# Patient Record
Sex: MALE | Race: WHITE | Employment: OTHER | URBAN - METROPOLITAN AREA
[De-identification: names, ages, dates, MRNs, and addresses within clinical notes are randomized per-mention and may not be internally consistent; named-entity substitution may affect disease eponyms.]

---

## 2021-10-30 ENCOUNTER — HOSPITAL ENCOUNTER (INPATIENT)
Age: 21
LOS: 4 days | Discharge: HOME | DRG: 885 | End: 2021-11-03
Attending: STUDENT IN AN ORGANIZED HEALTH CARE EDUCATION/TRAINING PROGRAM | Admitting: PSYCHIATRY & NEUROLOGY
Payer: COMMERCIAL

## 2021-10-30 ENCOUNTER — APPOINTMENT (OUTPATIENT)
Dept: CT IMAGING | Age: 21
DRG: 885 | End: 2021-10-30
Payer: COMMERCIAL

## 2021-10-30 ENCOUNTER — APPOINTMENT (OUTPATIENT)
Dept: GENERAL RADIOLOGY | Age: 21
DRG: 885 | End: 2021-10-30
Payer: COMMERCIAL

## 2021-10-30 ENCOUNTER — APPOINTMENT (OUTPATIENT)
Dept: ULTRASOUND IMAGING | Age: 21
DRG: 885 | End: 2021-10-30
Payer: COMMERCIAL

## 2021-10-30 DIAGNOSIS — E87.6 HYPOKALEMIA: ICD-10-CM

## 2021-10-30 DIAGNOSIS — R45.1 AGITATION: ICD-10-CM

## 2021-10-30 DIAGNOSIS — F22 PARANOID (HCC): Primary | ICD-10-CM

## 2021-10-30 PROBLEM — F23 ACUTE PSYCHOSIS (HCC): Status: ACTIVE | Noted: 2021-10-30

## 2021-10-30 LAB
ACETAMINOPHEN LEVEL: <5 MCG/ML (ref 10–30)
ALBUMIN SERPL-MCNC: 5 G/DL (ref 3.5–5.2)
ALP BLD-CCNC: 59 U/L (ref 40–129)
ALT SERPL-CCNC: 17 U/L (ref 0–40)
AMPHETAMINE SCREEN, URINE: NOT DETECTED
ANION GAP SERPL CALCULATED.3IONS-SCNC: 15 MMOL/L (ref 7–16)
ANION GAP SERPL CALCULATED.3IONS-SCNC: 21 MMOL/L (ref 7–16)
AST SERPL-CCNC: 26 U/L (ref 0–39)
BACTERIA: ABNORMAL /HPF
BARBITURATE SCREEN URINE: NOT DETECTED
BASOPHILS ABSOLUTE: 0.04 E9/L (ref 0–0.2)
BASOPHILS RELATIVE PERCENT: 0.5 % (ref 0–2)
BENZODIAZEPINE SCREEN, URINE: POSITIVE
BETA-HYDROXYBUTYRATE: 0.7 MMOL/L (ref 0.02–0.27)
BILIRUB SERPL-MCNC: 1.7 MG/DL (ref 0–1.2)
BILIRUBIN URINE: ABNORMAL
BLOOD, URINE: NEGATIVE
BUN BLDV-MCNC: 13 MG/DL (ref 6–20)
BUN BLDV-MCNC: 13 MG/DL (ref 6–20)
CALCIUM SERPL-MCNC: 9.2 MG/DL (ref 8.6–10.2)
CALCIUM SERPL-MCNC: 9.4 MG/DL (ref 8.6–10.2)
CANNABINOID SCREEN URINE: POSITIVE
CHLORIDE BLD-SCNC: 105 MMOL/L (ref 98–107)
CHLORIDE BLD-SCNC: 96 MMOL/L (ref 98–107)
CHP ED QC CHECK: NORMAL
CLARITY: CLEAR
CO2: 16 MMOL/L (ref 22–29)
CO2: 20 MMOL/L (ref 22–29)
COCAINE METABOLITE SCREEN URINE: NOT DETECTED
COLOR: YELLOW
CREAT SERPL-MCNC: 1.1 MG/DL (ref 0.7–1.2)
CREAT SERPL-MCNC: 1.1 MG/DL (ref 0.7–1.2)
EOSINOPHILS ABSOLUTE: 0.01 E9/L (ref 0.05–0.5)
EOSINOPHILS RELATIVE PERCENT: 0.1 % (ref 0–6)
ETHANOL: <10 MG/DL (ref 0–0.08)
FENTANYL SCREEN, URINE: NOT DETECTED
GFR AFRICAN AMERICAN: >60
GFR AFRICAN AMERICAN: >60
GFR NON-AFRICAN AMERICAN: >60 ML/MIN/1.73
GFR NON-AFRICAN AMERICAN: >60 ML/MIN/1.73
GLUCOSE BLD-MCNC: 167 MG/DL
GLUCOSE BLD-MCNC: 167 MG/DL (ref 74–99)
GLUCOSE BLD-MCNC: 86 MG/DL (ref 74–99)
GLUCOSE URINE: NEGATIVE MG/DL
HCT VFR BLD CALC: 46.4 % (ref 37–54)
HEMOGLOBIN: 16 G/DL (ref 12.5–16.5)
IMMATURE GRANULOCYTES #: 0.03 E9/L
IMMATURE GRANULOCYTES %: 0.4 % (ref 0–5)
INFLUENZA A: NOT DETECTED
INFLUENZA B: NOT DETECTED
KETONES, URINE: 40 MG/DL
LACTIC ACID: 0.7 MMOL/L (ref 0.5–2.2)
LEUKOCYTE ESTERASE, URINE: NEGATIVE
LYMPHOCYTES ABSOLUTE: 1.06 E9/L (ref 1.5–4)
LYMPHOCYTES RELATIVE PERCENT: 14.4 % (ref 20–42)
Lab: ABNORMAL
MCH RBC QN AUTO: 30.2 PG (ref 26–35)
MCHC RBC AUTO-ENTMCNC: 34.5 % (ref 32–34.5)
MCV RBC AUTO: 87.7 FL (ref 80–99.9)
METHADONE SCREEN, URINE: NOT DETECTED
MONOCYTES ABSOLUTE: 0.29 E9/L (ref 0.1–0.95)
MONOCYTES RELATIVE PERCENT: 3.9 % (ref 2–12)
MUCUS: PRESENT /LPF
NEUTROPHILS ABSOLUTE: 5.92 E9/L (ref 1.8–7.3)
NEUTROPHILS RELATIVE PERCENT: 80.7 % (ref 43–80)
NITRITE, URINE: NEGATIVE
OPIATE SCREEN URINE: NOT DETECTED
OXYCODONE URINE: NOT DETECTED
PDW BLD-RTO: 11.8 FL (ref 11.5–15)
PH UA: 6 (ref 5–9)
PHENCYCLIDINE SCREEN URINE: NOT DETECTED
PLATELET # BLD: 260 E9/L (ref 130–450)
PMV BLD AUTO: 11 FL (ref 7–12)
POTASSIUM SERPL-SCNC: 3.3 MMOL/L (ref 3.5–5)
POTASSIUM SERPL-SCNC: 4.5 MMOL/L (ref 3.5–5)
PROTEIN UA: ABNORMAL MG/DL
RBC # BLD: 5.29 E12/L (ref 3.8–5.8)
RBC UA: ABNORMAL /HPF (ref 0–2)
SALICYLATE, SERUM: <0.3 MG/DL (ref 0–30)
SARS-COV-2 RNA, RT PCR: NOT DETECTED
SODIUM BLD-SCNC: 133 MMOL/L (ref 132–146)
SODIUM BLD-SCNC: 140 MMOL/L (ref 132–146)
SPECIFIC GRAVITY UA: 1.02 (ref 1–1.03)
T4 TOTAL: 8.5 MCG/DL (ref 4.5–11.7)
TOTAL PROTEIN: 7 G/DL (ref 6.4–8.3)
TRICYCLIC ANTIDEPRESSANTS SCREEN SERUM: NEGATIVE NG/ML
TSH SERPL DL<=0.05 MIU/L-ACNC: 1.48 UIU/ML (ref 0.27–4.2)
UROBILINOGEN, URINE: 1 E.U./DL
WBC # BLD: 7.4 E9/L (ref 4.5–11.5)
WBC UA: ABNORMAL /HPF (ref 0–5)

## 2021-10-30 PROCEDURE — 2580000003 HC RX 258: Performed by: STUDENT IN AN ORGANIZED HEALTH CARE EDUCATION/TRAINING PROGRAM

## 2021-10-30 PROCEDURE — 6370000000 HC RX 637 (ALT 250 FOR IP): Performed by: NURSE PRACTITIONER

## 2021-10-30 PROCEDURE — 96360 HYDRATION IV INFUSION INIT: CPT

## 2021-10-30 PROCEDURE — 83605 ASSAY OF LACTIC ACID: CPT

## 2021-10-30 PROCEDURE — 96361 HYDRATE IV INFUSION ADD-ON: CPT

## 2021-10-30 PROCEDURE — 84443 ASSAY THYROID STIM HORMONE: CPT

## 2021-10-30 PROCEDURE — 80143 DRUG ASSAY ACETAMINOPHEN: CPT

## 2021-10-30 PROCEDURE — 6370000000 HC RX 637 (ALT 250 FOR IP): Performed by: STUDENT IN AN ORGANIZED HEALTH CARE EDUCATION/TRAINING PROGRAM

## 2021-10-30 PROCEDURE — 80307 DRUG TEST PRSMV CHEM ANLYZR: CPT

## 2021-10-30 PROCEDURE — 76705 ECHO EXAM OF ABDOMEN: CPT

## 2021-10-30 PROCEDURE — 93005 ELECTROCARDIOGRAM TRACING: CPT | Performed by: STUDENT IN AN ORGANIZED HEALTH CARE EDUCATION/TRAINING PROGRAM

## 2021-10-30 PROCEDURE — 71045 X-RAY EXAM CHEST 1 VIEW: CPT

## 2021-10-30 PROCEDURE — 81001 URINALYSIS AUTO W/SCOPE: CPT

## 2021-10-30 PROCEDURE — 99285 EMERGENCY DEPT VISIT HI MDM: CPT

## 2021-10-30 PROCEDURE — 96372 THER/PROPH/DIAG INJ SC/IM: CPT

## 2021-10-30 PROCEDURE — 70450 CT HEAD/BRAIN W/O DYE: CPT

## 2021-10-30 PROCEDURE — 84436 ASSAY OF TOTAL THYROXINE: CPT

## 2021-10-30 PROCEDURE — 80053 COMPREHEN METABOLIC PANEL: CPT

## 2021-10-30 PROCEDURE — 87636 SARSCOV2 & INF A&B AMP PRB: CPT

## 2021-10-30 PROCEDURE — 1240000000 HC EMOTIONAL WELLNESS R&B

## 2021-10-30 PROCEDURE — 80048 BASIC METABOLIC PNL TOTAL CA: CPT

## 2021-10-30 PROCEDURE — 85025 COMPLETE CBC W/AUTO DIFF WBC: CPT

## 2021-10-30 PROCEDURE — 6360000002 HC RX W HCPCS

## 2021-10-30 PROCEDURE — 82077 ASSAY SPEC XCP UR&BREATH IA: CPT

## 2021-10-30 PROCEDURE — 80179 DRUG ASSAY SALICYLATE: CPT

## 2021-10-30 PROCEDURE — 82010 KETONE BODYS QUAN: CPT

## 2021-10-30 RX ORDER — HYDROXYZINE PAMOATE 50 MG/1
50 CAPSULE ORAL 3 TIMES DAILY PRN
Status: DISCONTINUED | OUTPATIENT
Start: 2021-10-30 | End: 2021-11-03 | Stop reason: HOSPADM

## 2021-10-30 RX ORDER — HALOPERIDOL 5 MG
5 TABLET ORAL EVERY 6 HOURS PRN
Status: DISCONTINUED | OUTPATIENT
Start: 2021-10-30 | End: 2021-11-03 | Stop reason: HOSPADM

## 2021-10-30 RX ORDER — MIDAZOLAM HYDROCHLORIDE 2 MG/2ML
2 INJECTION, SOLUTION INTRAMUSCULAR; INTRAVENOUS ONCE
Status: COMPLETED | OUTPATIENT
Start: 2021-10-30 | End: 2021-10-30

## 2021-10-30 RX ORDER — TRAZODONE HYDROCHLORIDE 50 MG/1
50 TABLET ORAL NIGHTLY PRN
Status: DISCONTINUED | OUTPATIENT
Start: 2021-10-30 | End: 2021-11-03 | Stop reason: HOSPADM

## 2021-10-30 RX ORDER — OLANZAPINE 5 MG/1
10 TABLET, ORALLY DISINTEGRATING ORAL 2 TIMES DAILY
Status: DISCONTINUED | OUTPATIENT
Start: 2021-10-31 | End: 2021-10-31

## 2021-10-30 RX ORDER — POTASSIUM CHLORIDE 20 MEQ/1
40 TABLET, EXTENDED RELEASE ORAL ONCE
Status: COMPLETED | OUTPATIENT
Start: 2021-10-30 | End: 2021-10-30

## 2021-10-30 RX ORDER — ACETAMINOPHEN 325 MG/1
650 TABLET ORAL EVERY 6 HOURS PRN
Status: DISCONTINUED | OUTPATIENT
Start: 2021-10-30 | End: 2021-11-03 | Stop reason: HOSPADM

## 2021-10-30 RX ORDER — OLANZAPINE 5 MG/1
10 TABLET ORAL ONCE
Status: COMPLETED | OUTPATIENT
Start: 2021-10-30 | End: 2021-10-30

## 2021-10-30 RX ORDER — DIPHENHYDRAMINE HYDROCHLORIDE 50 MG/ML
INJECTION INTRAMUSCULAR; INTRAVENOUS
Status: COMPLETED
Start: 2021-10-30 | End: 2021-10-30

## 2021-10-30 RX ORDER — 0.9 % SODIUM CHLORIDE 0.9 %
1000 INTRAVENOUS SOLUTION INTRAVENOUS ONCE
Status: COMPLETED | OUTPATIENT
Start: 2021-10-30 | End: 2021-10-30

## 2021-10-30 RX ORDER — MAGNESIUM HYDROXIDE/ALUMINUM HYDROXICE/SIMETHICONE 120; 1200; 1200 MG/30ML; MG/30ML; MG/30ML
30 SUSPENSION ORAL PRN
Status: DISCONTINUED | OUTPATIENT
Start: 2021-10-30 | End: 2021-11-03 | Stop reason: HOSPADM

## 2021-10-30 RX ORDER — DROPERIDOL 2.5 MG/ML
5 INJECTION, SOLUTION INTRAMUSCULAR; INTRAVENOUS ONCE
Status: COMPLETED | OUTPATIENT
Start: 2021-10-30 | End: 2021-10-30

## 2021-10-30 RX ORDER — ZIPRASIDONE MESYLATE 20 MG/ML
20 INJECTION, POWDER, LYOPHILIZED, FOR SOLUTION INTRAMUSCULAR EVERY 12 HOURS PRN
Status: DISCONTINUED | OUTPATIENT
Start: 2021-10-30 | End: 2021-11-03 | Stop reason: HOSPADM

## 2021-10-30 RX ORDER — MIDAZOLAM HYDROCHLORIDE 1 MG/ML
INJECTION INTRAMUSCULAR; INTRAVENOUS
Status: COMPLETED
Start: 2021-10-30 | End: 2021-10-30

## 2021-10-30 RX ORDER — DIPHENHYDRAMINE HYDROCHLORIDE 50 MG/ML
50 INJECTION INTRAMUSCULAR; INTRAVENOUS ONCE
Status: COMPLETED | OUTPATIENT
Start: 2021-10-30 | End: 2021-10-30

## 2021-10-30 RX ORDER — DROPERIDOL 2.5 MG/ML
INJECTION, SOLUTION INTRAMUSCULAR; INTRAVENOUS
Status: COMPLETED
Start: 2021-10-30 | End: 2021-10-30

## 2021-10-30 RX ORDER — NICOTINE 21 MG/24HR
1 PATCH, TRANSDERMAL 24 HOURS TRANSDERMAL DAILY
Status: DISCONTINUED | OUTPATIENT
Start: 2021-10-31 | End: 2021-10-30

## 2021-10-30 RX ORDER — DIVALPROEX SODIUM 250 MG/1
1000 TABLET, DELAYED RELEASE ORAL ONCE
Status: COMPLETED | OUTPATIENT
Start: 2021-10-30 | End: 2021-10-30

## 2021-10-30 RX ORDER — DIVALPROEX SODIUM 500 MG/1
500 TABLET, DELAYED RELEASE ORAL EVERY 12 HOURS SCHEDULED
Status: DISCONTINUED | OUTPATIENT
Start: 2021-10-30 | End: 2021-11-03 | Stop reason: HOSPADM

## 2021-10-30 RX ORDER — DEXTROSE AND SODIUM CHLORIDE 5; .9 G/100ML; G/100ML
INJECTION, SOLUTION INTRAVENOUS CONTINUOUS
Status: DISCONTINUED | OUTPATIENT
Start: 2021-10-30 | End: 2021-11-03 | Stop reason: HOSPADM

## 2021-10-30 RX ORDER — HALOPERIDOL 5 MG/ML
5 INJECTION INTRAMUSCULAR EVERY 6 HOURS PRN
Status: DISCONTINUED | OUTPATIENT
Start: 2021-10-30 | End: 2021-11-03 | Stop reason: HOSPADM

## 2021-10-30 RX ADMIN — DIVALPROEX SODIUM 1000 MG: 250 TABLET, DELAYED RELEASE ORAL at 21:10

## 2021-10-30 RX ADMIN — DIPHENHYDRAMINE HYDROCHLORIDE 50 MG: 50 INJECTION INTRAMUSCULAR; INTRAVENOUS at 12:51

## 2021-10-30 RX ADMIN — MIDAZOLAM 2 MG: 1 INJECTION INTRAMUSCULAR; INTRAVENOUS at 12:51

## 2021-10-30 RX ADMIN — MIDAZOLAM HYDROCHLORIDE 2 MG: 2 INJECTION, SOLUTION INTRAMUSCULAR; INTRAVENOUS at 12:51

## 2021-10-30 RX ADMIN — POTASSIUM CHLORIDE 40 MEQ: 20 TABLET, EXTENDED RELEASE ORAL at 17:33

## 2021-10-30 RX ADMIN — SODIUM CHLORIDE 1000 ML: 9 INJECTION, SOLUTION INTRAVENOUS at 14:11

## 2021-10-30 RX ADMIN — DROPERIDOL 5 MG: 2.5 INJECTION, SOLUTION INTRAMUSCULAR; INTRAVENOUS at 12:51

## 2021-10-30 RX ADMIN — DIPHENHYDRAMINE HYDROCHLORIDE 50 MG: 50 INJECTION, SOLUTION INTRAMUSCULAR; INTRAVENOUS at 12:51

## 2021-10-30 RX ADMIN — OLANZAPINE 10 MG: 5 TABLET, FILM COATED ORAL at 19:39

## 2021-10-30 ASSESSMENT — LIFESTYLE VARIABLES: HISTORY_ALCOHOL_USE: NO

## 2021-10-30 ASSESSMENT — SLEEP AND FATIGUE QUESTIONNAIRES
RESTFUL SLEEP: NO
DIFFICULTY STAYING ASLEEP: YES
DO YOU USE A SLEEP AID: NO
DIFFICULTY ARISING: YES
DIFFICULTY FALLING ASLEEP: YES
AVERAGE NUMBER OF SLEEP HOURS: 3
DO YOU HAVE DIFFICULTY SLEEPING: YES

## 2021-10-30 NOTE — ED NOTES
If further testing comes back ok, patient has been accepted to the psych unit. Pt is to have Depakote 1000 mg and Zyprexa 10 mg prior to going upstairs. Further orders will be placed once testing is resulted.       Yolanda Olvera RN  10/30/21 4577 Víctor Miller RN  10/30/21 9095

## 2021-10-30 NOTE — ED NOTES
Emergency Department [facility] AN AFFILIATE OF [facility] Biopsychosocial Assessment Note    Chief Complaint: pt is a 23 yo male on a pink slip who presents to the ED as a walk-in, pt accompanied by sister who provides information as pt was medicated due to agitation. Pt presenting as extremely paranoid with behavioral agitation, was banging head on walls, trying to leave unit, not cooperative to needed procedures. Voicing multiple delusional themes, also stating that \". .. everyone in the world is trying to kill me. \"  Sister reports a family hx of bipolar disorder, reports behavior began to reflect increasing paranoia over past 2 weeks, has increased greatly over past 48 hours resulting in ED visit today. Reports pt has been having difficulties in relationship with girlfriend over past couple moths, feels this might be triggering rise in symptoms. MSE: Pt is hyperalert, oriented only to self, behavior agitated, at times banging head and fists on walls, affect fearful, needs constant re-direction, at times must be restrained from hurting self. Voicing many delusional themes regarding conspiracy theories and groups who are stalking and trying to kill him. Clinical Summary/History: Sister reports pt is not open with anyone in community for counseling or psychiatric services, no hx of in-patient admissions of prescribed medications, does however report a significant family history of bi-polar disorder, reports pt's older brother committed suicide 2 years ago. Gender  [x] Male [] Female [] Transgender  [] Other    Sexual Orientation    [x] Heterosexual [] Homosexual [] Bisexual [] Other    Suicidal Behavioral: CSSR-S Complete. [] Reports:    [] Past [] Present  None stated  [] Denies    Homicidal/ Violent Behavior  [] Reports:   [] Past [] Present None stated  [] Denies     Hallucinations/Delusions   [x] Reports:  Clearly experiencing delusional ideation of a paranoid nature.    [] Denies     Substance Use/Alcohol Use/Addiction: SBIRT Screen Complete. [] Reports: Reports on-going marijuana use  [] Denies     Trauma History  [] Reports:  [] Denies - none stated.     Collateral Information:       Level of Care/Disposition Plan: Awaiting medical clearance and will be referred for psychiatric in-patient  [] Home:   [] Outpatient Provider:   [] Crisis Unit:   [x] Inpatient Psychiatric Unit:  [] Other:        Sherren Croon, MSW, MAHESH  10/30/21 9128

## 2021-10-30 NOTE — ED NOTES
Pt sister brought a bad of clean clothing for admission. Backpack was locked up in locker #27.       Myriam Nguyen, JESSICA, [place]  10/30/21 1940

## 2021-10-30 NOTE — ED PROVIDER NOTES
---------------------------------------------  Past Medical History:  has no past medical history on file. Past Surgical History:  has no past surgical history on file. Social History:      Family History: family history is not on file. . Unless otherwise noted, family history is non contributory    The patients home medications have been reviewed. Allergies: Patient has no allergy information on record. I have reviewed the past medical history, past surgical history, social history, and family history    ---------------------------------------------------PHYSICAL EXAM--------------------------------------    General: The patient is agitated. He is restrained by multiple police officers and attempting to punch  Head: Normocephalic and atraumatic. Eyes: Sclera non-icteric and no conjunctival injection  ENT: Nasal and oral membranes appear dry  Neck: Trachea midline. No JVD  Respiratory: Lungs clear to auscultation bilaterally. Patient speaking in full sentences. Cardiovascular: Tachycardic. No pedal edema. Gastrointestinal:  Abdomen is soft and nondistended. Bowel sounds present. There is no tenderness. There is no guarding or rebound. Musculoskeletal: No deformity. No bony tenderness  Skin: Skin warm and dry. No rashes. Neurologic: No gross motor deficits. No aphasia. Psychiatric: [N the patient has tangential speech. He is paranoid. He is stating that people are trying to kill him. The patient is attempting to talk to his phone stating that he is dying. He keeps mentioning Biden and Trump and is panicking    -------------------------------------------------- RESULTS -------------------------------------------------  I have personally reviewed all laboratory and imaging results for this patient. Results are listed below.      LABS: (Lab results interpreted by me)  Results for orders placed or performed during the hospital encounter of 10/30/21   COVID-19 & Influenza Combo    Specimen: Nasopharyngeal Swab   Result Value Ref Range    SARS-CoV-2 RNA, RT PCR NOT DETECTED NOT DETECTED    INFLUENZA A NOT DETECTED NOT DETECTED    INFLUENZA B NOT DETECTED NOT DETECTED   Comprehensive Metabolic Panel   Result Value Ref Range    Sodium 133 132 - 146 mmol/L    Potassium 3.3 (L) 3.5 - 5.0 mmol/L    Chloride 96 (L) 98 - 107 mmol/L    CO2 16 (L) 22 - 29 mmol/L    Anion Gap 21 (H) 7 - 16 mmol/L    Glucose 167 (H) 74 - 99 mg/dL    BUN 13 6 - 20 mg/dL    CREATININE 1.1 0.7 - 1.2 mg/dL    GFR Non-African American >60 >=60 mL/min/1.73    GFR African American >60     Calcium 9.4 8.6 - 10.2 mg/dL    Total Protein 7.0 6.4 - 8.3 g/dL    Albumin 5.0 3.5 - 5.2 g/dL    Total Bilirubin 1.7 (H) 0.0 - 1.2 mg/dL    Alkaline Phosphatase 59 40 - 129 U/L    ALT 17 0 - 40 U/L    AST 26 0 - 39 U/L   CBC Auto Differential   Result Value Ref Range    WBC 7.4 4.5 - 11.5 E9/L    RBC 5.29 3.80 - 5.80 E12/L    Hemoglobin 16.0 12.5 - 16.5 g/dL    Hematocrit 46.4 37.0 - 54.0 %    MCV 87.7 80.0 - 99.9 fL    MCH 30.2 26.0 - 35.0 pg    MCHC 34.5 32.0 - 34.5 %    RDW 11.8 11.5 - 15.0 fL    Platelets 381 955 - 880 E9/L    MPV 11.0 7.0 - 12.0 fL    Neutrophils % 80.7 (H) 43.0 - 80.0 %    Immature Granulocytes % 0.4 0.0 - 5.0 %    Lymphocytes % 14.4 (L) 20.0 - 42.0 %    Monocytes % 3.9 2.0 - 12.0 %    Eosinophils % 0.1 0.0 - 6.0 %    Basophils % 0.5 0.0 - 2.0 %    Neutrophils Absolute 5.92 1.80 - 7.30 E9/L    Immature Granulocytes # 0.03 E9/L    Lymphocytes Absolute 1.06 (L) 1.50 - 4.00 E9/L    Monocytes Absolute 0.29 0.10 - 0.95 E9/L    Eosinophils Absolute 0.01 (L) 0.05 - 0.50 E9/L    Basophils Absolute 0.04 0.00 - 0.20 E9/L   Serum Drug Screen   Result Value Ref Range    Ethanol Lvl <10 mg/dL    Acetaminophen Level <5.0 (L) 10.0 - 54.3 mcg/mL    Salicylate, Serum <8.7 0.0 - 30.0 mg/dL    TCA Scrn NEGATIVE Cutoff:300 ng/mL   Urine Drug Screen   Result Value Ref Range    Amphetamine Screen, Urine NOT DETECTED Negative <1000 ng/mL    Barbiturate Screen, Ur NOT DETECTED Negative < 200 ng/mL    Benzodiazepine Screen, Urine POSITIVE (A) Negative < 200 ng/mL    Cannabinoid Scrn, Ur POSITIVE (A) Negative < 50ng/mL    Cocaine Metabolite Screen, Urine NOT DETECTED Negative < 300 ng/mL    Opiate Scrn, Ur NOT DETECTED Negative < 300ng/mL    PCP Screen, Urine NOT DETECTED Negative < 25 ng/mL    Methadone Screen, Urine NOT DETECTED Negative <300 ng/mL    Oxycodone Urine NOT DETECTED Negative <100 ng/mL    FENTANYL SCREEN, URINE NOT DETECTED Negative <1 ng/mL    Drug Screen Comment: see below    TSH without Reflex   Result Value Ref Range    TSH 1.480 0.270 - 4.200 uIU/mL   T4   Result Value Ref Range    T4, Total 8.5 4.5 - 11.7 mcg/dL   Urinalysis with Microscopic   Result Value Ref Range    Color, UA Yellow Straw/Yellow    Clarity, UA Clear Clear    Glucose, Ur Negative Negative mg/dL    Bilirubin Urine SMALL (A) Negative    Ketones, Urine 40 (A) Negative mg/dL    Specific Gravity, UA 1.025 1.005 - 1.030    Blood, Urine Negative Negative    pH, UA 6.0 5.0 - 9.0    Protein, UA TRACE Negative mg/dL    Urobilinogen, Urine 1.0 <2.0 E.U./dL    Nitrite, Urine Negative Negative    Leukocyte Esterase, Urine Negative Negative    Mucus, UA Present (A) None Seen /LPF    WBC, UA 1-3 0 - 5 /HPF    RBC, UA NONE 0 - 2 /HPF    Bacteria, UA FEW (A) None Seen /HPF   Lactic Acid, Plasma   Result Value Ref Range    Lactic Acid 0.7 0.5 - 2.2 mmol/L   Basic metabolic panel   Result Value Ref Range    Sodium 140 132 - 146 mmol/L    Potassium 4.5 3.5 - 5.0 mmol/L    Chloride 105 98 - 107 mmol/L    CO2 20 (L) 22 - 29 mmol/L    Anion Gap 15 7 - 16 mmol/L    Glucose 86 74 - 99 mg/dL    BUN 13 6 - 20 mg/dL    CREATININE 1.1 0.7 - 1.2 mg/dL    GFR Non-African American >60 >=60 mL/min/1.73    GFR African American >60     Calcium 9.2 8.6 - 10.2 mg/dL   Beta-Hydroxybutyrate   Result Value Ref Range    Beta-Hydroxybutyrate 0.70 (H) 0.02 - 0.27 mmol/L   POCT Glucose   Result Value Ref Range Glucose 167 mg/dL    QC OK? ok    ,     RADIOLOGY:  Interpreted by Radiologist unless otherwise specified  [address]   Final Result   Normal appearance of the gallbladder and extrahepatic common bile duct. No   cholelithiasis or evidence of cholecystitis. CT HEAD WO CONTRAST   Final Result   No acute intracranial abnormality. There is sinus disease with retention cyst in the sphenoid and left maxillary   sinus         XR CHEST PORTABLE   Final Result   Normal             ------------------------- NURSING NOTES AND VITALS REVIEWED ---------------------------   The nursing notes within the ED encounter and vital signs as below have been reviewed by myself  /88   Pulse 89   Temp 98 °F (36.7 °C)   Resp 20   SpO2 98%     Oxygen Saturation Interpretation: Normal    The patients available past medical records and past encounters were reviewed. ------------------------------ ED COURSE/MEDICAL DECISION MAKING----------------------  Medications   dextrose 5 % and 0.9 % sodium chloride infusion (has no administration in time range)   divalproex (DEPAKOTE) DR tablet 1,000 mg (has no administration in time range)   droperidol (INAPSINE) injection 5 mg (5 mg IntraMUSCular Given 10/30/21 1251)   midazolam PF (VERSED) injection 2 mg (2 mg IntraMUSCular Given 10/30/21 1251)   diphenhydrAMINE (BENADRYL) injection 50 mg (50 mg IntraMUSCular Given 10/30/21 1251)   0.9 % sodium chloride bolus (0 mLs IntraVENous Stopped 10/30/21 1620)   potassium chloride (KLOR-CON M) extended release tablet 40 mEq (40 mEq Oral Given 10/30/21 1733)   OLANZapine (ZYPREXA) tablet 10 mg (10 mg Oral Given 10/30/21 1939)       Medical Decision Making: This is a 24 y.o. male presenting to the ED for paranoia. On initial presentation, the patient's vital signs are interpreted as normotensive, tachycardic, afebrile and saturating well on room air.  Based on history and physical exam, we have a broad differential.  We are concerned for substance use, new onset psychiatric disorder, or intracranial pathology. The patient is moving all extremities. He is a danger to self and others. He is attempting to physically hit police officers. He required sedation with droperidol, Benadryl, and Versed. As the patient continues to be agitated he will be restrained for the safety of himself and others. We cannot exclude metabolic causes of his change in mentation. At this time will obtain CT of the head as well as chest x-ray and laboratory studies. The patient will be hydrated with a liter bolus. Will obtain an EKG and social work will be consulted. A 12-lead EKG was performed and interpreted by myself. The rate is 112 with sinus tachycardia and right axis deviation. Patient has evidence of atrial enlargement. The NV interval is 130, QRS interval is 86, and QTC interval is 425. No acute ST elevation or depressions. There is sinus tachycardia with atrial large minute right axis deviation with nonspecific abnormality. The patient is calm down will be removed from restraints. Laboratory studies shows hypokalemia of 3.3. He does have acidosis with bicarbonate of 16. Anion gap 21. We will add lactic acid and beta hydroxybutyrate. I do feel this is likely starvation ketosis. Patient will be started on D5 0.9. Bilirubin elevated at 1.7. TSH within normal limits. Covid and influenza negative. Tylenol and salicylate negative. Tricyclic and alcohol negative. No leukocytosis or anemia. Urinalysis shows no evidence of infection. Urine drug screen positive for cannabinoids and benzodiazepines. Chest x-ray shows no acute process. CT of the head shows no acute acute abnormality. Sinus disease with retention cyst.  This is interpreted by radiology. Because the patient has elevated bilirubin I did reevaluate him. He denies any current abdominal pain. He does state he had abdominal pain a few days ago.   Abdominal exam is soft. No right upper quadrant tenderness. Will obtain an ultrasound to assess for further gallbladder evaluation. Repeat electrolytes will be performed after hydration. Repeat electrolytes show improving acidosis with bicarbonate of 20. Anion gap closed. Potassium within normal limits. Beta hydroxybutyrate mildly elevated. Ultrasound shows normal gallbladder and extrahepatic bile duct. No cholelithiasis or cholecystitis. This is interpreted by radiology. On reevaluation the patient's vitals have significantly improved. He is no longer tachycardic. He is acting appropriately. He does admit that he has been hallucinating. He is paranoid. He will require psychiatry evaluation and social work consult. The patient is medically cleared. This patient's ED course included: a personal history and physicial examination and re-evaluation prior to disposition    This patient has improved during their ED course. Consultations:  Social work    The cardiac monitor revealed sinus tachycardia with a heart rate in the 120s as interpreted by me. The cardiac monitor was ordered secondary to the patient's agitation and to monitor the patient for dysrhythmia. CPT [id number]    Oxygen Saturation Interpretation: 98 % on room air. Normal    Counseling:   I have spoken with the patient and discussed todays results, in addition to providing specific details for the plan of care and counseling regarding the diagnosis and prognosis. Questions are answered at this time and they are agreeable with the plan.     --------------------------------- IMPRESSION AND DISPOSITION ---------------------------------    IMPRESSION  1. Paranoid ([address]    2. Agitation    3. Hypokalemia        DISPOSITION  Disposition: Pending psychiatry evaluation, medically cleared  Patient condition is fair    IDr. Hieu, am the primary provider of record    NOTE: This report was transcribed using voice recognition software.  Every effort was made to ensure accuracy; however, inadvertent computerized transcription errors may be present        Mary Ann Longo MD  10/30/21 2050

## 2021-10-30 NOTE — ED NOTES
Patient has been accepted for admission to [facility] by Roman Neumann NP under Dr. Yuniel Lowe pending new medical orders and results.       JESSICA Willson, [place]  10/30/21 3069

## 2021-10-31 PROBLEM — F43.10 PTSD (POST-TRAUMATIC STRESS DISORDER): Status: ACTIVE | Noted: 2021-10-31

## 2021-10-31 PROBLEM — F31.2 SEVERE MANIC BIPOLAR 1 DISORDER WITH PSYCHOTIC BEHAVIOR (HCC): Status: ACTIVE | Noted: 2021-10-31

## 2021-10-31 PROBLEM — F23 ACUTE PSYCHOSIS (HCC): Status: RESOLVED | Noted: 2021-10-30 | Resolved: 2021-10-31

## 2021-10-31 PROBLEM — F12.10 CANNABIS ABUSE: Status: ACTIVE | Noted: 2021-10-31

## 2021-10-31 PROCEDURE — 6370000000 HC RX 637 (ALT 250 FOR IP): Performed by: PSYCHIATRY & NEUROLOGY

## 2021-10-31 PROCEDURE — 6370000000 HC RX 637 (ALT 250 FOR IP): Performed by: NURSE PRACTITIONER

## 2021-10-31 PROCEDURE — 99222 1ST HOSP IP/OBS MODERATE 55: CPT | Performed by: NURSE PRACTITIONER

## 2021-10-31 PROCEDURE — 1240000000 HC EMOTIONAL WELLNESS R&B

## 2021-10-31 RX ORDER — PRAZOSIN HYDROCHLORIDE 1 MG/1
1 CAPSULE ORAL NIGHTLY
Status: DISCONTINUED | OUTPATIENT
Start: 2021-10-31 | End: 2021-11-03 | Stop reason: HOSPADM

## 2021-10-31 RX ORDER — OLANZAPINE 5 MG/1
10 TABLET, ORALLY DISINTEGRATING ORAL NIGHTLY
Status: DISCONTINUED | OUTPATIENT
Start: 2021-11-01 | End: 2021-11-03 | Stop reason: HOSPADM

## 2021-10-31 RX ADMIN — TRAZODONE HYDROCHLORIDE 50 MG: 50 TABLET ORAL at 22:09

## 2021-10-31 RX ADMIN — OLANZAPINE 10 MG: 5 TABLET, ORALLY DISINTEGRATING ORAL at 09:49

## 2021-10-31 RX ADMIN — DIVALPROEX SODIUM 500 MG: 250 TABLET, DELAYED RELEASE ORAL at 09:49

## 2021-10-31 RX ADMIN — DIVALPROEX SODIUM 500 MG: 250 TABLET, DELAYED RELEASE ORAL at 22:07

## 2021-10-31 RX ADMIN — HYDROXYZINE PAMOATE 50 MG: 50 CAPSULE ORAL at 22:07

## 2021-10-31 RX ADMIN — PHENYTOIN 1 MG: 125 SUSPENSION ORAL at 22:07

## 2021-10-31 RX ADMIN — NICOTINE POLACRILEX 2 MG: 2 GUM, CHEWING BUCCAL at 16:54

## 2021-10-31 ASSESSMENT — SLEEP AND FATIGUE QUESTIONNAIRES
SLEEP PATTERN: DIFFICULTY FALLING ASLEEP;DISTURBED/INTERRUPTED SLEEP;INSOMNIA;RESTLESSNESS;DIFFICULTY ARISING
DO YOU HAVE DIFFICULTY SLEEPING: YES
AVERAGE NUMBER OF SLEEP HOURS: 5
DIFFICULTY ARISING: YES
DIFFICULTY FALLING ASLEEP: YES
DO YOU USE A SLEEP AID: NO
DIFFICULTY STAYING ASLEEP: YES
RESTFUL SLEEP: NO

## 2021-10-31 ASSESSMENT — LIFESTYLE VARIABLES: HISTORY_ALCOHOL_USE: NO

## 2021-10-31 ASSESSMENT — PAIN - FUNCTIONAL ASSESSMENT
PAIN_FUNCTIONAL_ASSESSMENT: 0-10
PAIN_FUNCTIONAL_ASSESSMENT: 0-10

## 2021-10-31 NOTE — BH NOTE
[facility]  Initial Interdisciplinary Treatment Plan NOTE    Review Date & Time: 10-31-21  1100 am    Patient was not in treatment team    Admission Type:   Admission Type: Involuntary    Reason for admission:  Reason for Admission: I QUIT SMOKING MJ HEAVY ABOUT A WEEK AGO, FELT OVERWHELMED EVERYTHING HIT ME. I WOULD FEEL SUICIDAL AT TIMES BUT WOULD NEVER  DO IT      Estimated Length of Stay Update:  2-4 days  Estimated Discharge Date Update: 2-4 days    EDUCATION:   Learner Progress Toward Treatment Goals: Reviewed results and recommendations of this team and Reviewed group plan and strategies    Method: Small group    Outcome: Verbalized understanding    PATIENT GOALS: pt did not report a goal during first morning first assessment. PLAN/TREATMENT RECOMMENDATIONS UPDATE: Begin medication regimen and assess pt responses. GOALS UPDATE:   Time frame for Short-Term Goals: Daily re assessment.      Jenelle Camarena RN

## 2021-10-31 NOTE — PROGRESS NOTES
States his thoughts are improved today and does feel safer today. Out of his room and at times will walk zepeda or watch football.

## 2021-10-31 NOTE — PLAN OF CARE
Problem: Altered Mood, Psychotic Behavior:  Goal: Able to demonstrate trust by eating, participating in treatment and following staff's direction  Description: Able to demonstrate trust by eating, participating in treatment and following staff's direction  10/31/2021 1807 by Syd Saravia RN  Outcome: Met This Shift     Problem: Altered Mood, Psychotic Behavior:  Goal: Able to verbalize decrease in frequency and intensity of hallucinations  Description: Able to verbalize decrease in frequency and intensity of hallucinations  Outcome: Met This Shift     Problem: Altered Mood, Psychotic Behavior:  Goal: Able to verbalize reality based thinking  Description: Able to verbalize reality based thinking  Outcome: Ongoing     Problem: Altered Mood, Psychotic Behavior:  Goal: Absence of self-harm  Description: Absence of self-harm  Outcome: Met This Shift     Problem: Altered Mood, Psychotic Behavior:  Goal: Ability to achieve adequate nutritional intake will improve  Description: Ability to achieve adequate nutritional intake will improve  Outcome: Met This Shift     Problem: Altered Mood, Psychotic Behavior:  Goal: Ability to interact with others will improve  Description: Ability to interact with others will improve  Outcome: Ongoing     Problem: Altered Mood, Psychotic Behavior:  Goal: Compliance with prescribed medication regimen will improve  Description: Compliance with prescribed medication regimen will improve  Outcome: Ongoing     Problem: Altered Mood, Psychotic Behavior:  Goal: Patient specific goal  Description: Patient specific goal  Outcome: Not Met This Shift

## 2021-10-31 NOTE — H&P
Department of Psychiatry  History and Physical - Adult   I have personally seen and assessed evaluated the patient this morning and I agree with the below assessment evaluation and recommendations by the medical student. Mental status examination reveals a 20-year-old  male with average hygiene average grooming superficially forthcoming and cooperative for assessment. Psychomotor reveals no agitation retardation involuntary movement or abnormal posture. Speech is clear, he is able to answer questions with relevance there is a slight latency in response. Eye contact is good. Mood is \"I know I am not right. \"  Affect is paranoid, guarded congruent with stated mood. Thought process is concrete tangential.  Thought content is paranoid he denies auditory or visual hallucinations however endorses rapid intrusive thoughts. He denies suicidal or homicidal ideation intent or plan however he was pink slipped yesterday for paranoid and agitated behaviors. There are no neurovegetative signs of depression. memory seems to be intact for conversation. Insight judgment impulse control are poor. He is alert and oriented to person place time and can recount some of the events that led to his hospitalization. CHIEF COMPLAINT:  \"I was losing it a bit yesterday\"    Patient was seen after discussing with the treatment team and reviewing the chart    CIRCUMSTANCES OF ADMISSION: Pt on 7SE today 10/31/21 after sister brought him to the ED yesterday where he was pink slipped for paranoid and agitated behavior. UDS positive for benzodiazepines and cannabis. Pt was medically cleared and hemodynamically stable PTA.      HISTORY OF PRESENT ILLNESS:      The patient is a 24 y.o. high school educated male self employed as a , born in [address] [place] [address], raised in [address] [place] [address] in a 3 year relationship with girlfriend, significant past history of marijuana use, with family hx of bipolar disorder in brother who committed suicide by hanging 2 years ago, maternal depression here on 7SE today 10/31/21 after his sister brought him to the ED yesterday where he was pink slipped for paranoid and agitated behavior. Per ED, pt has been talking about the government, paranoid that people are after him, believes he is dying and additionally admitted to [address] [place] [address] and recent marijuana use. Pt was agitated, uncooperative and attempted to punch security at ED. Pt's UDS was positive for benzodiazepines and cannabis. Per pt's sister, pt has been having frequent delusions e.g. \"everyone in the world is trying to kill me\" over the last 2 weeks with little to no sleep during this time. Pt given Depakote 1000 mg and Zyprexa 10 mg PTA to 7SE. Pt was medically cleared and hemodynamically stable PTA. On evaluation today in 7SE, pt is AAOx3 in [facility], cooperative with depressed mood. Pt states that he had an argument with his girlfriend 2 weeks ago about \"taking a break\" which triggered his mental decline. He states that over the last few weeks he felt that he \"couldn't think while I was talking and didn't know what I was saying\" and admits to only sleeping 1-2 hours a night during this time as well. Pt states that he felt as if \"everyone was against me\" yesterday but states those feelings have since resolved after sleeping well last night for the first time in a long time. He states he feels \"relieved\" that he can finally \"think properly\" after sleeping last night. Pt stating that this is the first time something like this has ever happened to him and denying hx of psychiatric admission or psychiatric issues in the past. He, however, admits to having had episodes of grandiose thinking and guillermo in the past.    Pt goes on to say that his mental health decline really began 2 years ago when his brother committed suicide via hanging himself. Pt was close to his brother.  He states that he feels guilty about what happened because he had a missed call from his brother the day he committed suicide and he feels that he could have maybe prevented his brother's death if he had picked up the phone or returned the call. Pt admits to having frequent intrusive thoughts, flashbacks and nightmares regarding this. He states that he began abusing marijuana 3 days after his brother's death in order to cope and increase his appetite and admits to having smoked marijuana 3x daily until he allegedly quit a week ago. He noticed that the marijuana made his mental health sx worse in that he felt more disorganized and confused. Since he has quit pt reports to feeling more \"antsy\" with decreased appetite. Pt admits that he has not fully processed the death of his brother and is interested in getting counseling at this time. He admits to feeling guilty, decreased interest in things that used to bring him maryann, poor concentration, poor appetite, feeling empty inside, easily abandoned and that he is not who he is supposed to be. Pt denies ever making a suicide attempt in the past, hx of cutting, SI, HI or AVH at this time. Past psych hx: Pt has never seen a psychiatrist in the past. Pt has no hx of previous psych admissions. On chart review, pt has no formal diagnoses and is not prescribed any medication at this time. Family hx: Maternal depression, brother () bipolar. Brother committed suicide 2019 by hanging. Substance abuse hx: Pt admits to only marijuana use and denies any other drug or alcohol use. No documented hx other than positive marijuana and benzodiazepines on UDS 10/31/21. Legal hx: Denies hx of legal issues. Social hx: Pt born in [facility], raised in [facility] with 4 siblings. Pt attended and graduated [employer] but did not attend college. He states that he starting [employer] and later started his own [id number]. Pt's parents are still alive and supportive of pt. Pt states that his father has firearms locked in safe at home.  Pt lives with father. Past Medical History:    History reviewed. No pertinent past medical history. Medications Prior to Admission:   No medications prior to admission. Past Surgical History:    History reviewed. No pertinent surgical history. Allergies:   Patient has no known allergies. Family History  Family History   Problem Relation Age of Onset    Mental Illness Mother     Mental Illness Brother          EXAMINATION:    REVIEW OF SYSTEMS:    ROS:  [x] All negative/unchanged except if checked.  Explain positive(checked items) below:  [] Constitutional  [] Eyes  [] Ear/Nose/Mouth/Throat  [] Respiratory  [] CV  [] GI  []   [] Musculoskeletal  [] Skin/Breast  [] Neurological  [] Endocrine  [] Heme/Lymph  [] Allergic/Immunologic    Explanation:     Vitals:  /68   Pulse 71   Temp 98.2 °F (36.8 °C)   Resp 15   Ht 5' 11.5\" (1.816 m)   Wt 165 lb 3.2 oz (74.9 kg)   SpO2 98%   BMI 22.72 kg/m²      Physical Examination:   Head: x  Atraumatic: x normocephalic  Skin and Mucosa        Moist x  Dry   Pale  x Normal   Neck:  Thyroid  Palpable   x  Not palpable   venus distention   adenopathy   Chest: x Clear   Rhonchi     Wheezing   CV:  xS1   xS2    xNo murmer   Abdomen:  x  Soft    Tender    Viceromegaly   Extremities:  x No Edema     Edema     Cranial Nerves Examination:   CN II:   xPupils are reactive to light  Pupils are non reactive to light  CN III, IV, VI:  xNo eye deviation    No diplopia or ptosis   CN V:    xFacial Sensation is intact     Facial Sensation is not intact   CN IIIV:   x Hearing is normal to rubbing fingers   CN IX, X:     xNormal gag reflex and phonation   CN XI:   xShoulder shrug and neck rotation is normal  CNXII:    xTongue is midline no deviation or atrophy    Mental Status Examination:    Level of consciousness:  within normal limits and awake   Appearance:  hospital attire, seated in bed, fair grooming and fair hygiene  Behavior/Motor:  no abnormalities noted  Attitude toward examiner:  cooperative and good eye contact  Speech:  normal rate, normal volume and monotone   Mood: depressed  Affect:  mood congruent and blunted  Thought processes:  linear and coherent   Thought content:  Homocidal ideation denies  Suicidal Ideation:  denies suicidal ideation, without plan and without intent  Delusions:  no evidence of delusions  Cognition:  oriented to person, place, and time   Concentration intact  Memory intact  Insight fair   Judgement poor   Fund of Knowledge adequate    DIAGNOSIS:    Bipolar 1 disorder, recent episode manic with psychotic features  PTSD  Cannabis abuse    LABS: REVIEWED TODAY:  Recent Labs     10/30/21  1204   WBC 7.4   HGB 16.0        Recent Labs     10/30/21  1204 10/30/21  1356 10/30/21  1730     --  140   K 3.3*  --  4.5   CL 96*  --  105   CO2 16*  --  20*   BUN 13  --  13   CREATININE 1.1  --  1.1   GLUCOSE 167* 167 86     Recent Labs     10/30/21  1204   BILITOT 1.7*   ALKPHOS 59   AST 26   ALT 17     Lab Results   Component Value Date    LABAMPH NOT DETECTED 10/30/2021    BARBSCNU NOT DETECTED 10/30/2021    LABBENZ POSITIVE 10/30/2021    LABMETH NOT DETECTED 10/30/2021    OPIATESCREENURINE NOT DETECTED 10/30/2021    PHENCYCLIDINESCREENURINE NOT DETECTED 10/30/2021    ETOH <10 10/30/2021     Lab Results   Component Value Date    TSH 1.480 10/30/2021     No results found for: LITHIUM  No results found for: VALPROATE, CBMZ  No results found for: LITHIUM, VALPROATE      Radiology CT HEAD WO CONTRAST    Result Date: 10/30/2021  EXAMINATION: CT OF THE HEAD WITHOUT CONTRAST  10/30/2021 4:16 pm TECHNIQUE: CT of the head was performed without the administration of intravenous contrast. Dose modulation, iterative reconstruction, and/or weight based adjustment of the mA/kV was utilized to reduce the radiation dose to as low as reasonably achievable. COMPARISON: None.  HISTORY: ORDERING SYSTEM PROVIDED HISTORY: mental status change TECHNOLOGIST PROVIDED HISTORY: Has a \"code stroke\" or \"stroke alert\" been called? ->No Reason for exam:->mental status change Decision Support Exception - unselect if not a suspected or confirmed emergency medical condition->Emergency Medical Condition (MA) What reading provider will be dictating this exam?->CRC FINDINGS: BRAIN/VENTRICLES: There is no acute intracranial hemorrhage, mass effect or midline shift. No abnormal extra-axial fluid collection. The gray-white differentiation is maintained without evidence of an acute infarct. There is no evidence of hydrocephalus. ORBITS: The visualized portion of the orbits demonstrate no acute abnormality. SINUSES: The visualized paranasal sinuses and mastoid air cells demonstrate no acute abnormality. Except for retention cyst in the right sphenoid and left maxillary sinuses. SOFT TISSUES/SKULL:  No acute abnormality of the visualized skull or soft tissues. No acute intracranial abnormality. There is sinus disease with retention cyst in the sphenoid and left maxillary sinus     US GALLBLADDER RUQ    Result Date: 10/30/2021  EXAMINATION: RIGHT UPPER QUADRANT ULTRASOUND 10/30/2021 6:39 pm COMPARISON: None. HISTORY: ORDERING SYSTEM PROVIDED HISTORY: elevated bili TECHNOLOGIST PROVIDED HISTORY: Reason for exam:->elevated bili What reading provider will be dictating this exam?->CRC FINDINGS: LIVER:  Imaged liver is grossly unremarkable. BILIARY SYSTEM:  Gallbladder is unremarkable without evidence of pericholecystic fluid, wall thickening or stones. Negative sonographic Duvall's sign reported. Common bile duct is within normal limits measuring 3-4 mm. OTHER: No evidence of right upper quadrant ascites. Normal appearance of the gallbladder and extrahepatic common bile duct. No cholelithiasis or evidence of cholecystitis. XR CHEST PORTABLE    Result Date: 10/30/2021  EXAMINATION: ONE XRAY VIEW OF THE CHEST 10/30/2021 12:02 pm COMPARISON: None.  HISTORY: ORDERING SYSTEM PROVIDED HISTORY: mental status change TECHNOLOGIST PROVIDED HISTORY: Reason for exam:->mental status change What reading provider will be dictating this exam?->CRC FINDINGS: The heart and mediastinum are normal for portable technique. Lung aeration and architecture is normal. The pleural surfaces appear normal.     Normal         TREATMENT PLAN:  The patient's diagnosis, treatment plan, medication management were formulated after patient was seen directly by the attending physician and myself and all relevant documentation was reviewed. The patient was referred to outpatient/inpatient substance abuse rehabilitation programming. He was educated multiple times during the hospitalization that if he chooses to continue to use drugs or alcohol, he may potentially act out impulsively, resulting in serious harm to self or others, even though unintentional.  He was also educated that mental health treatment cannot be optimized with ongoing use of drugs. He demonstrated understanding has the capacity to understand that. Risk, benefit, side effects, possible outcomes of the medication and alternatives discussed with the patient and the patient demonstrated understanding. The patient was also educated that the outcome of treatment will depend on the medication compliance as directed by the prescribers along with regular follow-up, compliance with the labs and other work-up, as clinically indicated. Risk Management: Based on the diagnosis and assessment biopsychosocial treatment model was presented to the patient and was given the opportunity to ask any question. The patient was agreeable to the plan and all the patient's questions were answered to the patient's satisfaction. I discussed with the patient the risk, benefit, alternative and common side effects for the proposed medication treatment. The patient is consenting to this treatment.      Collateral Information:  Will obtain collateral information from the family or friends. Will obtain medical records as appropriate from out patient providers  Will consult the hospitalist for a physical exam to rule out any co-morbid physical condition. Home medication Reconciled       New Medications started during this admission :    Depakote 500 mg twice daily for mood stabilization  Zyprexa 10 mg nightly for psychosis  Prazosin 1 mg nightly for PTSD symptoms including nightmares    Prn Haldol 5mg and Vistaril 50mg q6hr for extreme agitation. Trazodone as ordered for insomnia  Vistaril as ordered for anxiety      Psychotherapy:   Encourage participation in milieu and group therapy  Individual therapy as needed    NOTE: This report was transcribed using voice recognition software. Every effort was made to ensure accuracy; however, inadvertent computerized transcription errors may be present. Behavioral Services  Medicare Certification Upon Admission    I certify that this patient's inpatient psychiatric hospital admission is medically necessary for:    [x] (1) Treatment which could reasonably be expected to improve this patient's condition,       [x] (2) Or for diagnostic study;     AND     [x](2) The inpatient psychiatric services are provided while the individual is under the care of a physician and are included in the individualized plan of care.     Estimated length of stay/service 5 to 7 days based on stability    Plan for post-hospital care follow with outpatient provider    Electronically signed by ANSLEY Lucio CNP  on 10/31/2021 at 10:50 AM

## 2021-10-31 NOTE — PROGRESS NOTES
No behaviors noted on night shift and no PRN's were given. No signs or symptoms of discomfort or distress noted. Pt is lying in bed with eye's closed. Will continue fifteen minute rounds.

## 2021-10-31 NOTE — CARE COORDINATION
Biopsychosocial Assessment Note    LPC met with patient to complete the biopsychosocial assessment and CSSR-S. Mental Status Exam: Pt is alert and oriented x4. Pt presents with a pleasant, euthymic mood and congruent affect. Pt's eye contact is fair and speech and motor activity are normal. Pt confirms previous delusions but denies AVH. He reports his memory is good. Pt appears to minimize symptoms and exhibits poor insight and judgement. Pt denies a history of or current SI/HI. Chief Complaint: paranoid behavior, delusions    Patient Report: Pt reports he is here due to having a \"break down. \" Pt states he was thinking \"all kinds of crazy stuff,\" but states he now knows these things were not true. Pt reports he is still anxious but is feeling much better than when he was admitted. 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 Pt reports up until a week ago, he was using marijuana 3 times daily. He reports that he lives with two friends in an apartment currently, and he denies any issues with his living situation. Pt denies history of abuse or trauma, however per reports he previously stated he lost a loved one and felt it was traumatic. Pt works full-time as a self-employed . He denies any history of self-harm, SI, or HI. He denies any current treatment and states he has never had treatment prior to this admission. He reports he desires to seek counseling following discharge.      Gender  [x] Male [] Female [] Transgender  [] Other    Sexual Orientation    [x] Heterosexual [] Homosexual [] Bisexual [] Other    Suicidal Ideation  [] Past [] Present [x] Denies     Homicidal Ideation  [] Past [] Present [x] Denies     Hallucinations/Delusions (Specify type)  [x] Reports [] Denies     Substance Use/Alcohol Use/Addiction  [x] Reports [] Denies - marijuana use 3x daily up until 1 week ago    Tobacco Use (within the last 6 months)  [x] Reports [] Denies     Trauma History  [] Reports [x] Denies     Collateral Contact ([facility] signed)  Name: Jerome Enriquez  Relationship: sister   Number: [phone]    Collateral Information: Pt's sister states she is only concerned about the pt feeling better and having the delusions addressed. She states this is not like him, so it just made her worried. She denies any further concerns and believes he is safe to return to his home upon discharge. She denies that the pt has access to any weapons at his home. Access to Weapons per Collateral Contact: [] Reports [x] Denies       Follow up provider preference: Pt reports he does not have a current provider, but desires to begin counseling upon discharge. Plan for discharge  Location (where do they plan on discharging to?): Pt plans to discharge to his home, an apartment shared with 2 roommates. Transportation (who will pick them up at discharge?) Pt believes his sister or girlfriend will pick him up at discharge. Medications (will they have money for copays at discharge?): Pt states he can afford any prescriptions.        Electronically signed by Armani Fernandez LPC on 10/31/2021 at 9:11 AM

## 2021-10-31 NOTE — PLAN OF CARE
Pt is stable and without distress. Pt denies suicidal or homicidal ideations. Pt denies hallucinations. Pt does not report a goal at this time. No other distress. Will follow and monitor.

## 2021-11-01 LAB
EKG ATRIAL RATE: 112 BPM
EKG P AXIS: 81 DEGREES
EKG P-R INTERVAL: 130 MS
EKG Q-T INTERVAL: 312 MS
EKG QRS DURATION: 86 MS
EKG QTC CALCULATION (BAZETT): 425 MS
EKG R AXIS: 108 DEGREES
EKG T AXIS: 50 DEGREES
EKG VENTRICULAR RATE: 112 BPM

## 2021-11-01 PROCEDURE — 6370000000 HC RX 637 (ALT 250 FOR IP): Performed by: NURSE PRACTITIONER

## 2021-11-01 PROCEDURE — 1240000000 HC EMOTIONAL WELLNESS R&B

## 2021-11-01 PROCEDURE — 6370000000 HC RX 637 (ALT 250 FOR IP): Performed by: PSYCHIATRY & NEUROLOGY

## 2021-11-01 PROCEDURE — 99232 SBSQ HOSP IP/OBS MODERATE 35: CPT | Performed by: NURSE PRACTITIONER

## 2021-11-01 RX ADMIN — PHENYTOIN 1 MG: 125 SUSPENSION ORAL at 20:53

## 2021-11-01 RX ADMIN — OLANZAPINE 10 MG: 5 TABLET, ORALLY DISINTEGRATING ORAL at 20:54

## 2021-11-01 RX ADMIN — NICOTINE POLACRILEX 2 MG: 2 GUM, CHEWING BUCCAL at 11:53

## 2021-11-01 RX ADMIN — DIVALPROEX SODIUM 500 MG: 250 TABLET, DELAYED RELEASE ORAL at 10:18

## 2021-11-01 RX ADMIN — NICOTINE POLACRILEX 2 MG: 2 GUM, CHEWING BUCCAL at 17:18

## 2021-11-01 RX ADMIN — DIVALPROEX SODIUM 500 MG: 250 TABLET, DELAYED RELEASE ORAL at 20:54

## 2021-11-01 RX ADMIN — HYDROXYZINE PAMOATE 50 MG: 50 CAPSULE ORAL at 20:55

## 2021-11-01 ASSESSMENT — PAIN SCALES - GENERAL
PAINLEVEL_OUTOF10: 0
PAINLEVEL_OUTOF10: 0

## 2021-11-01 NOTE — PLAN OF CARE
Problem: Altered Mood, Psychotic Behavior:  Goal: Able to verbalize reality based thinking  Description: Able to verbalize reality based thinking  Outcome: Met This Shift  NO VOICED DELUSIONS. Goal: Absence of self-harm  Description: Absence of self-harm  Outcome: Met This Shift  NO SELF HARM BEHAVIORS REPORTED OR OBSERVED. Goal: Ability to interact with others will improve  Description: Ability to interact with others will improve  Outcome: Ongoing  PT. IS GUARDED, QUIET, KEEPS TO SELF.

## 2021-11-01 NOTE — PROGRESS NOTES
BEHAVIORAL HEALTH FOLLOW-UP NOTE     11/1/2021     Patient was seen and examined in person, Chart reviewed   Patient's case discussed with staff/team    Patient personally seen and examined by me and mental status exam performed. I agree the below assessment by the medical student. Psychomotor evaluation revealed no agitation retardation any abnormal movements. His eye contact is fair his speech is normal rate rhythm and tone. His mood is \"I feel better. \"  Affect is a little anxious. His thought process is linear without flight of ideas loose associations. Thought contents devoid of any auditory visual hallucinations but he is somewhat guarded. He denies suicidal homicidal ideations intent or plan impulse control is adequate cognitive function appears to be his baseline his insight judgment is fair he is alert oriented time place and person        Chief Complaint: \"I feel better\"    Interim History:     Pt has been seen out on the unit watching football on TV or walking down the halls. Pt is seen on treatment team this morning AAOx3, cooperative and attentive. He reports to improved mood and thoughts stating that he feels \"much better\" after having slept for a few days and taking medications. Pt has been attending groups and states that he has plans to follow up with [facility] in [facility] for follow-up. He states that he plans to live with his parents for the first week post d/c to make sure he has some support while he tries to return to normal life. He states that he has talked to his sisters and that they believe he is ready to go home. Pt reports his main stressor at this time is the fact that he owns a max business and is fixated on discharge at this time. Pt denies SI, HI and AVH at this time.      Appetite:   [x] Normal/Unchanged  [] Increased  [] Decreased      Sleep:       [x] Normal/Unchanged  [] Fair       [] Poor              Energy:    [x] Normal/Unchanged  [] Increased  [] Decreased        SI [] Present  [x] Absent    HI  []Present  [x] Absent     Aggression:  [] yes  [x] no    Patient is [x] able  [] unable to CONTRACT FOR SAFETY     PAST MEDICAL/PSYCHIATRIC HISTORY:   History reviewed. No pertinent past medical history. 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     Comment: LAST WORKED [employer]   Tobacco Use    Smoking status: Former Smoker     Years: 1.00    Smokeless tobacco: Never Used   Vaping Use    Vaping Use: Every day    Substances: Nicotine, CBD, Flavoring    Devices: Disposable   Substance and Sexual Activity    Alcohol use: Not Currently     Comment: VERY RARELY    Drug use: Yes     Types: Marijuana Champ Cue)     Comment: QUIT 1 WEEK AGO    Sexual activity: Yes     Partners: Female   Other Topics Concern    Not on file   Social History Narrative    Not on file     Social Determinants of Health     Financial Resource Strain:     Difficulty of Paying Living Expenses:    Food Insecurity:     Worried About Running Out of Food in the Last Year:     Ran Out of Food in the Last Year:    Transportation Needs:     Lack of Transportation (Medical):      Lack of Transportation (Non-Medical):    Physical Activity:     Days of Exercise per Week:     Minutes of Exercise per Session:    Stress:     Feeling of Stress :    Social Connections:     Frequency of Communication with Friends and Family:     Frequency of Social Gatherings with Friends and Family:     Attends Church Services:     Active Member of Clubs or Organizations:     Attends Club or Organization Meetings:     Marital Status:    Intimate Partner Violence:     Fear of Current or Ex-Partner:     Emotionally Abused:     Physically Abused:     Sexually Abused:            ROS:  [x] All negative/unchanged except if checked.  Explain positive(checked items) below:  [] Constitutional  [] Eyes  [] Ear/Nose/Mouth/Throat  [] Respiratory  [] CV  [] GI  []   [] Musculoskeletal  [] Skin/Breast  [] Neurological  [] Endocrine  [] Heme/Lymph  [] Allergic/Immunologic    Explanation:     MEDICATIONS:    Current Facility-Administered Medications:     OLANZapine zydis (ZYPREXA) disintegrating tablet 10 mg, 10 mg, Oral, Nightly, Chanetta Heimlich, APRN - CNP    prazosin (MINIPRESS) capsule 1 mg, 1 mg, Oral, Nightly, Chanetta Heimlich, APRN - CNP, 1 mg at 10/31/21 2207    dextrose 5 % and 0.9 % sodium chloride infusion, , IntraVENous, Continuous, Olga Kauffman MD    acetaminophen (TYLENOL) tablet 650 mg, 650 mg, Oral, Q6H PRN, Chanetta Heimlich, APRN - CNP    magnesium hydroxide (MILK OF MAGNESIA) 400 MG/5ML suspension 30 mL, 30 mL, Oral, Daily PRN, Chanetta Heimlich, APRN - CNP    aluminum & magnesium hydroxide-simethicone (MAALOX) 200-200-20 MG/5ML suspension 30 mL, 30 mL, Oral, PRN, Chanetta Heimlich, APRN - CNP    hydrOXYzine (VISTARIL) capsule 50 mg, 50 mg, Oral, TID PRN, Chanetta Heimlich, APRN - CNP, 50 mg at 10/31/21 2207    haloperidol (HALDOL) tablet 5 mg, 5 mg, Oral, Q6H PRN **OR** haloperidol lactate (HALDOL) injection 5 mg, 5 mg, IntraMUSCular, Q6H PRN, Chanetta Heimlich, APRN - CNP    traZODone (DESYREL) tablet 50 mg, 50 mg, Oral, Nightly PRN, Chanetta Heimlich, APRN - CNP, 50 mg at 10/31/21 2209    ziprasidone (GEODON) injection 20 mg, 20 mg, IntraMUSCular, Q12H PRN, Chanetta Heimlich, APRN - CNP    divalproex (DEPAKOTE) DR tablet 500 mg, 500 mg, Oral, 2 times per day, Chanetta Heimlich, APRN - CNP, 500 mg at 11/01/21 1018    nicotine polacrilex (NICORETTE) gum 2 mg, 2 mg, Oral, Q2H PRN, Mitzi Davila MD, 2 mg at 11/01/21 1153    influenza quadrivalent split vaccine (FLUZONE;FLUARIX;FLULAVAL;AFLURIA) injection 0.5 mL, 0.5 mL, IntraMUSCular, Prior to discharge, Layla Katz MD      Examination:  /60   Pulse 65   Temp 97 °F (36.1 °C)   Resp 14   Ht 5' 11.5\" (1.816 m)   Wt 165 lb 3.2 oz (74.9 kg)   SpO2 98%   BMI 22.72 kg/m²   Gait - steady  Medication side effects(SE): none reported    Mental Status Examination:    Level of consciousness:  within normal limits   Appearance:  fair grooming and fair hygiene  Behavior/Motor:  no abnormalities noted  Attitude toward examiner:  cooperative, attentive and good eye contact  Speech:  normal rate and normal volume   Mood: depressed  Affect:  blunted  Thought processes:  coherent   Thought content:  Preoccupied with discharge. Denies SI, HI and AVH. Fixated on discharge. Cognition:  oriented to person, place, and time   Concentration intact  Insight fair   Judgement poor     ASSESSMENT:   Patient symptoms are:  [] Well controlled  [x] Improving  [] Worsening  [] No change      Diagnosis:   Principal Problem:    Severe manic bipolar 1 disorder with psychotic behavior ([facility] [address]  Active Problems:    PTSD (post-traumatic stress disorder)    Cannabis abuse  Resolved Problems:    * No resolved hospital problems.  *      LABS:    Recent Labs     10/30/21  1204   WBC 7.4   HGB 16.0        Recent Labs     10/30/21  1204 10/30/21  1356 10/30/21  1730     --  140   K 3.3*  --  4.5   CL 96*  --  105   CO2 16*  --  20*   BUN 13  --  13   CREATININE 1.1  --  1.1   GLUCOSE 167* 167 86     Recent Labs     10/30/21  1204   BILITOT 1.7*   ALKPHOS 59   AST 26   ALT 17     Lab Results   Component Value Date    LABAMPH NOT DETECTED 10/30/2021    BARBSCNU NOT DETECTED 10/30/2021    LABBENZ POSITIVE 10/30/2021    LABMETH NOT DETECTED 10/30/2021    OPIATESCREENURINE NOT DETECTED 10/30/2021    PHENCYCLIDINESCREENURINE NOT DETECTED 10/30/2021    ETOH <10 10/30/2021     Lab Results   Component Value Date    TSH 1.480 10/30/2021     No results found for: LITHIUM  No results found for: VALPROATE, CBMZ      Treatment Plan:  Reviewed current Medications with the patient. Risks, benefits, side effects, drug-to-drug interactions and alternatives to treatment were discussed. Collateral information:   CD evaluation  Encourage patient to attend group and other milieu activities.   Discharge planning discussed with the patient and treatment team.    Depakote 500 mg twice daily for mood stabilization  Zyprexa 10 mg at bedtime for mood and psychosis  Prazosin 1 mg at bedtime for nightmares and flashbacks    PSYCHOTHERAPY/COUNSELING:  [x] Therapeutic interview  [x] Supportive  [] CBT  [] Ongoing  [] Other    [x] Patient continues to need, on a daily basis, active treatment furnished directly by or requiring the supervision of inpatient psychiatric personnel      Anticipated Length of stay: 1-2 more days based on stability            Electronically signed by Gerson Lamb on 11/1/2021 at 1:29 PM

## 2021-11-01 NOTE — CARE COORDINATION
Pt's appointments are scheduled at [facility] in [place]. SW met with pt to discuss appointment scheduling, office is 15 minutes from pt's home and he stated that this will be good for his follow up appointments.

## 2021-11-01 NOTE — PLAN OF CARE
[facility]  Day 3 Interdisciplinary Treatment Plan NOTE    Review Date & Time: 11/1/21 1000    Patient was in treatment team    Estimated Length of Stay Update:  1 WEEK  Estimated Discharge Date Update:  WED. EDUCATION:   Learner Progress Toward Treatment Goals: Reviewed results and recommendations of this team    Method: Small group    Outcome: Verbalized understanding and Needs reinforcement    PATIENT GOALS: \"STAY POSITIVE\"    PLAN/TREATMENT RECOMMENDATIONS UPDATE: CONTINUE MEDICATIONS, GROUPS AS TOLERATED, ASSESS FOR PSYCHOSIS, EVAL/ASSESS FOR DISCHARGE TOMORROW    GOALS UPDATE:   Time frame for Short-Term Goals: 5 DAYS    PT. REPORTS IMPROVED MOOD, DENIES PARANOIA. PT. DENIES THOUGHTS OF HARM TO SELF, OTHERS. PT. DENIES HALLUCINATIONS. GROUPS ENCOURAGED.       Katherine Clarke RN

## 2021-11-02 LAB — VALPROIC ACID LEVEL: 78 MCG/ML (ref 50–100)

## 2021-11-02 PROCEDURE — 99232 SBSQ HOSP IP/OBS MODERATE 35: CPT | Performed by: NURSE PRACTITIONER

## 2021-11-02 PROCEDURE — 36415 COLL VENOUS BLD VENIPUNCTURE: CPT

## 2021-11-02 PROCEDURE — 6370000000 HC RX 637 (ALT 250 FOR IP): Performed by: PSYCHIATRY & NEUROLOGY

## 2021-11-02 PROCEDURE — 6370000000 HC RX 637 (ALT 250 FOR IP): Performed by: NURSE PRACTITIONER

## 2021-11-02 PROCEDURE — 1240000000 HC EMOTIONAL WELLNESS R&B

## 2021-11-02 PROCEDURE — 80164 ASSAY DIPROPYLACETIC ACD TOT: CPT

## 2021-11-02 RX ADMIN — OLANZAPINE 10 MG: 5 TABLET, ORALLY DISINTEGRATING ORAL at 20:47

## 2021-11-02 RX ADMIN — DIVALPROEX SODIUM 500 MG: 250 TABLET, DELAYED RELEASE ORAL at 20:47

## 2021-11-02 RX ADMIN — PHENYTOIN 1 MG: 125 SUSPENSION ORAL at 20:47

## 2021-11-02 RX ADMIN — DIVALPROEX SODIUM 500 MG: 250 TABLET, DELAYED RELEASE ORAL at 09:00

## 2021-11-02 RX ADMIN — HYDROXYZINE PAMOATE 50 MG: 50 CAPSULE ORAL at 16:57

## 2021-11-02 RX ADMIN — NICOTINE POLACRILEX 2 MG: 2 GUM, CHEWING BUCCAL at 11:09

## 2021-11-02 RX ADMIN — HYDROXYZINE PAMOATE 50 MG: 50 CAPSULE ORAL at 09:00

## 2021-11-02 RX ADMIN — TRAZODONE HYDROCHLORIDE 50 MG: 50 TABLET ORAL at 20:47

## 2021-11-02 ASSESSMENT — PAIN SCALES - GENERAL
PAINLEVEL_OUTOF10: 0

## 2021-11-02 NOTE — GROUP NOTE
Group Therapy Note    Date: 11/2/2021    Group Start Time: 1000  Group End Time: 8996  Group Topic: Psychoeducation    [facility]    Alyson Oakley, CTRS        Group Therapy Note    Number of participants: 10  Type of group: Psychoeducation  Mode of intervention: Education, Support, Socialization, Exploration, Clarifying, and Problem-solving  Topic: Mental Health Maintenance Plan   Objective: Pt will develop and identify 1 way to implement maintenance plan in recovery. Patient's Goal:  \"Stay patient\"    Notes:  Pt was interactive during group developing and sharing 1 way to implement maintenance plan in recovery. Pt gave support and feedback to others. Status After Intervention:  Improved    Participation Level:  Active Listener and Interactive    Participation Quality: Appropriate, Attentive, Sharing and Supportive      Speech:  normal      Thought Process/Content: Logical      Affective Functioning: Congruent      Mood: euthymic      Level of consciousness:  Alert, Oriented x4 and Attentive      Response to Learning: Able to verbalize current knowledge/experience, Able to verbalize/acknowledge new learning, Able to retain information, Capable of insight, Able to change behavior and Progressing to goal      Endings: None Reported    Modes of Intervention: Education, Support, Socialization, Exploration, Clarifying and Problem-solving

## 2021-11-02 NOTE — PLAN OF CARE
Out on the unit watching TV. Denies suicidal thoughts or intent to harm himself or others. Denies hallucinations. No voiced delusions.

## 2021-11-02 NOTE — GROUP NOTE
Group Therapy Note    Date: 11/2/2021    Group Start Time: 1100  Group End Time: 1140  Group Topic: Cognitive Skills    [facility]  [address], [facility]        Group Therapy Note    Attendees: 9         Patient's Goal:  Pt will be able to understand \"fair fighting rules\" and how to communicate with others effectively. Notes:  Pt participated in group and made connections. Status After Intervention:  Improved    Participation Level:  Active Listener and Interactive    Participation Quality: Appropriate, Attentive and Sharing      Speech:  normal      Thought Process/Content: Logical      Affective Functioning: Congruent      Mood: anxious      Level of consciousness:  Alert, Oriented x4 and Attentive      Response to Learning: Able to verbalize current knowledge/experience, Able to verbalize/acknowledge new learning, Able to retain information and Capable of insight      Endings: None Reported    Modes of Intervention: Education, Support, Socialization, Exploration, Clarifying, Problem-solving and Activity      Discipline Responsible: /Counselor      Signature:  JESSICA Granger, [place]

## 2021-11-02 NOTE — CARE COORDINATION
Collateral Contact (KYM signed)  Name: Kehinde Loveless  Relationship: sister   Number: [phone]    SW received VM from pt's sister. SW returned phone call and was informed that she is ready for the pt to return home and she has no concerns about his DC. SW informed sister that his expected DC is tomorrow. Sister requested for pt to be ready to DC early tomorrow morning if it is possible.

## 2021-11-02 NOTE — PROGRESS NOTES
Attended afternoon meet and greet. Aware of evening changes and updates. Pleasant and social during leisure activity of game stations. Was 1 of 10 in attendance.

## 2021-11-02 NOTE — PROGRESS NOTES
UP ON UNIT, IN CONTROL, COOPERATIVE TO BASIC UNIT ROUTINE, MEDICATIONS AND ATTENDS GROUPS. PT. DENIES SUICIDAL IDEATIONS, HOMICIDAL IDEATIONS AND HALLUCINATIONS. NO VOICED DELUSIONS AT TIMES OF ASSESSMENT. PT. HAS BEEN IN GOOD CONTROL WITH NO UNIT PROBLEMS.

## 2021-11-03 VITALS
DIASTOLIC BLOOD PRESSURE: 58 MMHG | RESPIRATION RATE: 16 BRPM | OXYGEN SATURATION: 98 % | SYSTOLIC BLOOD PRESSURE: 108 MMHG | WEIGHT: 165.2 LBS | HEIGHT: 72 IN | BODY MASS INDEX: 22.37 KG/M2 | HEART RATE: 82 BPM | TEMPERATURE: 97.2 F

## 2021-11-03 PROCEDURE — 6370000000 HC RX 637 (ALT 250 FOR IP): Performed by: PSYCHIATRY & NEUROLOGY

## 2021-11-03 PROCEDURE — G0008 ADMIN INFLUENZA VIRUS VAC: HCPCS | Performed by: PSYCHIATRY & NEUROLOGY

## 2021-11-03 PROCEDURE — 99239 HOSP IP/OBS DSCHRG MGMT >30: CPT | Performed by: NURSE PRACTITIONER

## 2021-11-03 PROCEDURE — 6370000000 HC RX 637 (ALT 250 FOR IP): Performed by: NURSE PRACTITIONER

## 2021-11-03 PROCEDURE — 6360000002 HC RX W HCPCS: Performed by: PSYCHIATRY & NEUROLOGY

## 2021-11-03 PROCEDURE — 90686 IIV4 VACC NO PRSV 0.5 ML IM: CPT | Performed by: PSYCHIATRY & NEUROLOGY

## 2021-11-03 RX ORDER — PRAZOSIN HYDROCHLORIDE 1 MG/1
1 CAPSULE ORAL NIGHTLY
Qty: 30 CAPSULE | Refills: 0 | Status: SHIPPED | OUTPATIENT
Start: 2021-11-03 | End: 2021-12-03

## 2021-11-03 RX ORDER — OLANZAPINE 10 MG/1
10 TABLET, ORALLY DISINTEGRATING ORAL NIGHTLY
Qty: 30 TABLET | Refills: 0 | Status: SHIPPED | OUTPATIENT
Start: 2021-11-03 | End: 2021-12-03

## 2021-11-03 RX ORDER — DIVALPROEX SODIUM 500 MG/1
500 TABLET, DELAYED RELEASE ORAL EVERY 12 HOURS SCHEDULED
Qty: 60 TABLET | Refills: 0 | Status: SHIPPED | OUTPATIENT
Start: 2021-11-03 | End: 2021-12-03

## 2021-11-03 RX ADMIN — HYDROXYZINE PAMOATE 50 MG: 50 CAPSULE ORAL at 08:56

## 2021-11-03 RX ADMIN — DIVALPROEX SODIUM 500 MG: 250 TABLET, DELAYED RELEASE ORAL at 08:32

## 2021-11-03 RX ADMIN — NICOTINE POLACRILEX 2 MG: 2 GUM, CHEWING BUCCAL at 08:56

## 2021-11-03 RX ADMIN — INFLUENZA A VIRUS A/VICTORIA/2570/2019 IVR-215 (H1N1) ANTIGEN (PROPIOLACTONE INACTIVATED), INFLUENZA A VIRUS A/CAMBODIA/E0826360/2020 IVR-224 (H3N2) ANTIGEN (PROPIOLACTONE INACTIVATED), INFLUENZA B VIRUS B/VICTORIA/705/2018 BVR-11 ANTIGEN (PROPIOLACTONE INACTIVATED), INFLUENZA B VIRUS B/PHUKET/3073/2013 BVR-1B ANTIGEN (PROPIOLACTONE INACTIVATED) 0.5 ML: 15; 15; 15; 15 INJECTION, SUSPENSION INTRAMUSCULAR at 15:59

## 2021-11-03 ASSESSMENT — PAIN SCALES - GENERAL: PAINLEVEL_OUTOF10: 0

## 2021-11-03 NOTE — DISCHARGE SUMMARY
DISCHARGE SUMMARY      Patient ID:  Danay Neil  [id number]  60 y.o.  2000    Admit date: 10/30/2021    Discharge date and time: 11/3/2021    Admitting Physician: Chana Lopez MD     Discharge Physician: Dr Madan Junior MD    Discharge Diagnoses:   Patient Active Problem List   Diagnosis    Severe manic bipolar 1 disorder with psychotic behavior ([address]    PTSD (post-traumatic stress disorder)    Cannabis abuse       Admission Condition: poor    Discharged Condition: stable    Admission Circumstance: Pt on [facility] today 10/31/21 after sister brought him to the ED yesterday where he was pink slipped for paranoid and agitated behavior. UDS positive for benzodiazepines and cannabis. Pt was medically cleared and hemodynamically stable PTA. PAST MEDICAL/PSYCHIATRIC HISTORY:   History reviewed. No pertinent past medical history.     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     Comment: LAST WORKED [employer]   Tobacco Use    Smoking status: Former Smoker     Years: 1.00    Smokeless tobacco: Never Used   Vaping Use    Vaping Use: Every day    Substances: Nicotine, CBD, Flavoring    Devices: Disposable   Substance and Sexual Activity    Alcohol use: Not Currently     Comment: VERY RARELY    Drug use: Yes     Types: Marijuana Kathy Ness)     Comment: QUIT 1 WEEK AGO    Sexual activity: Yes     Partners: Female   Other Topics Concern    Not on file   Social History Narrative    Not on file     Social Determinants of Health     Financial Resource Strain:     Difficulty of Paying Living Expenses:    Food Insecurity:     Worried About Running Out of Food in the Last APRN - CNP    divalproex (DEPAKOTE) DR tablet 500 mg, 500 mg, Oral, 2 times per day, Rosario Calderón, APRN - CNP, 500 mg at 11/03/21 1294    nicotine polacrilex (NICORETTE) gum 2 mg, 2 mg, Oral, Q2H PRN, Jasper Rollins MD, 2 mg at 11/03/21 0856    influenza quadrivalent split vaccine (FLUZONE;FLUARIX;FLULAVAL;AFLURIA) injection 0.5 mL, 0.5 mL, IntraMUSCular, Prior to discharge, Jasper Rollins MD    Examination:  BP (!) 108/58   Pulse 82   Temp 97.2 °F (36.2 °C) (Temporal)   Resp 16   Ht 5' 11.5\" (1.816 m)   Wt 165 lb 3.2 oz (74.9 kg)   SpO2 98%   BMI 22.72 kg/m²   Gait - steady    HOSPITAL COURSE[de-identified]  Patient was admitted to the unit on 10/30/2021 was closely monitored for psychosis. He was evaluated was treated with Zyprexa 10 mg at bedtime and Depakote 500 mg twice daily for mood stabilization medical events were insignificant and patient continued to improve on the floor. He start coming out of his room he is attending groups to socializing with peers. He never made any suicidal statements or any suicidal gestures while in the unit. Social workers obtain collateral information from patient's sister who was able to voicing concerns that she had sShe reported no safety concerns no access to any guns. Treatment team felt the patient obtain the maximum benefit from his hospitalization he was set up with an outpatient mental health agency for outpatient follow-up services. At the time of discharge patient did not show any impulsive behavior. He was up on the unit he was attending groups and socializing with peers. He vehemently denied any suicidal homicidal ideations intent or plan. He was eating well and sleeping well there are no neurovegetative signs or symptoms of depression he denied any auditory or visual hallucinations. There are no overt or covert signs of psychosis. He was appreciative of the help that he received here.   This patient no longer meets criteria for inpatient hospitalization. No AVH or paranoid thoughts  No Hopeless or worthless feeling  No active SI/HI  Appetite:  [x] Normal  [] Increased  [] Decreased    Sleep:       [x] Normal  [] Fair       [] Poor            Energy:    [x] Normal  [] Increased  [] Decreased     SI [] Present  [x] Absent  HI  []Present  [x] Absent   Aggression:  [] yes  [x] no  Patient is [x] able  [] unable to CONTRACT FOR SAFETY   Medication side effects(SE):  [x] None(Psych. Meds.) [] Other      Mental Status Examination on discharge:    Level of consciousness:  within normal limits   Appearance:  well-appearing  Behavior/Motor:  no abnormalities noted  Attitude toward examiner:  attentive and good eye contact  Speech:  spontaneous, normal rate and normal volume   Mood: \" My mood is good. \"  Affect: Bright appropriate and pleasant  Thought processes: Linear without flight of ideas loose associations  Thought content: Devoid of any auditory visual hallucinations delusions or other perceptual normalities. Denies SI/HI intent or plan  Cognition:  oriented to person, place, and time   Concentration intact  Memory intact  Insight good   Judgement fair   Fund of Knowledge adequate      ASSESSMENT:  Patient symptoms are:  [x] Well controlled  [x] Improving  [] Worsening  [] No change    Reason for more than one antipsychotic:  [x] N/A  [] 3 Failed Monotherapy attempts (Drugs tried:)  [] Crossover to a new antipsychotic  [] Taper to Monotherapy from Polypharmacy  [] Augmentation of clozapine therapy due to treatment resistance to single therapy    Diagnosis:  Principal Problem:    Severe manic bipolar 1 disorder with psychotic behavior ([facility] [address]  Active Problems:    PTSD (post-traumatic stress disorder)    Cannabis abuse  Resolved Problems:    * No resolved hospital problems. *      LABS:    No results for input(s): WBC, HGB, PLT in the last 72 hours. No results for input(s): NA, K, CL, CO2, BUN, CREATININE, GLUCOSE in the last 72 hours.   No results for input(s): BILITOT, ALKPHOS, AST, ALT in the last 72 hours. Lab Results   Component Value Date    LABAMPH NOT DETECTED 10/30/2021    BARBSCNU NOT DETECTED 10/30/2021    LABBENZ POSITIVE 10/30/2021    LABMETH NOT DETECTED 10/30/2021    OPIATESCREENURINE NOT DETECTED 10/30/2021    PHENCYCLIDINESCREENURINE NOT DETECTED 10/30/2021    ETOH <10 10/30/2021     Lab Results   Component Value Date    TSH 1.480 10/30/2021     No results found for: LITHIUM  Lab Results   Component Value Date    VALPROATE 78 11/02/2021       RISK ASSESSMENT AT DISCHARGE: Low risk for suicide and homicide. Treatment Plan:  Reviewed current Medications with the patient. Education provided on the complaince with treatment. Risks, benefits, side effects, drug-to-drug interactions and alternatives to treatment were discussed. Encourage patient to attend outpatient follow up appointment and therapy. Patient was advised to call the outpatient provider, visit the nearest ED or call 911 if symptoms are not manageable. Patient's family member was contacted prior to the discharge.          Medication List      START taking these medications    divalproex 500 MG DR tablet  Commonly known as: DEPAKOTE  Take 1 tablet by mouth every 12 hours     nicotine polacrilex 2 MG gum  Commonly known as: NICORETTE  Take 1 each by mouth every 2 hours as needed for Smoking cessation     OLANZapine zydis 10 MG disintegrating tablet  Commonly known as: ZYPREXA  Take 1 tablet by mouth nightly     prazosin 1 MG capsule  Commonly known as: MINIPRESS  Take 1 capsule by mouth nightly           Where to Get Your Medications      These medications were sent to [facility] [address] [id number]    Phone: [phone]   · divalproex 500 MG DR tablet  · OLANZapine zydis 10 MG disintegrating tablet  · prazosin 1 MG capsule     Information about where to get these medications is not yet available    Ask your nurse or doctor about these medications  · nicotine polacrilex 2 MG gum     Patient is counseled if he continues to abuse drugs or alcohol he could act out impulsively causing serious harm to himself or others even though may be unintentional.  He demonstrated understanding of this and has the capacity understand this    Patient is counseled hisr mental health treatment will be difficult to optimize with ongoing use of drugs or alcohol he demonstrate understanding of this as the past understand this     Patient is counseled that he he uses any amount of opiates after any clean time he could have an unintentional overdose he demonstrated understanding of this and has the capacity to understand this    Patient is counseled he must remain compliant with all medications outpatient follow-up ointments    Patient is discharged home in stable condition      TIME SPEND - 35 MINUTES TO COMPLETE THE EVALUATION, DISCHARGE SUMMARY, MEDICATION RECONCILIATION AND FOLLOW UP CARE     Signed:  ANSLEY Franks - CNP  02/7/4934  1:54 PM

## 2021-11-03 NOTE — PROGRESS NOTES
BEHAVIORAL HEALTH FOLLOW-UP NOTE     11/3/2021     Patient was seen and examined in person, Chart reviewed   Patient's case discussed with staff/team    Chief Complaint: \"I feel better\"    Interim History:     Patient up on the unit he is discharge focused. He states he is feeling \"much better. \"  He vehemently denies SI/HI intent or plan he denies any auditory visual hallucinations he believes the medications are working well for him. He is eating well sleeping well no neurovegetative signs of depression denies any auditory visualizations or no overt or covert signs psychosis      Appetite:   [x] Normal/Unchanged  [] Increased  [] Decreased      Sleep:       [x] Normal/Unchanged  [] Fair       [] Poor              Energy:    [x] Normal/Unchanged  [] Increased  [] Decreased        SI [] Present  [x] Absent    HI  []Present  [x] Absent     Aggression:  [] yes  [x] no    Patient is [x] able  [] unable to CONTRACT FOR SAFETY     PAST MEDICAL/PSYCHIATRIC HISTORY:   History reviewed. No pertinent past medical history.     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     Comment: LAST WORKED [employer]   Tobacco Use    Smoking status: Former Smoker     Years: 1.00    Smokeless tobacco: Never Used   Vaping Use    Vaping Use: Every day    Substances: Nicotine, CBD, Flavoring    Devices: Disposable   Substance and Sexual Activity    Alcohol use: Not Currently     Comment: VERY RARELY    Drug use: Yes     Types: Marijuana [facility]     Comment: QUIT 1 WEEK AGO    Sexual activity: Yes     Partners: Female   Other Topics Concern    Not on file   Social History Narrative    Not on file     Social Determinants of Health     Financial Resource Strain:     Difficulty of Paying Living Expenses:    Food Insecurity:     Worried About Running Out of Food in the Last Year:     [address] in the Last Year:    Transportation Needs:     Lack of Transportation (Medical):  Lack of Transportation (Non-Medical):    Physical Activity:     Days of Exercise per Week:     Minutes of Exercise per Session:    Stress:     Feeling of Stress :    Social Connections:     Frequency of Communication with Friends and Family:     Frequency of Social Gatherings with Friends and Family:     Attends Mu-ism Services:     Active Member of Clubs or Organizations:     Attends Club or Organization Meetings:     Marital Status:    Intimate Partner Violence:     Fear of Current or Ex-Partner:     Emotionally Abused:     Physically Abused:     Sexually Abused:            ROS:  [x] All negative/unchanged except if checked.  Explain positive(checked items) below:  [] Constitutional  [] Eyes  [] Ear/Nose/Mouth/Throat  [] Respiratory  [] CV  [] GI  []   [] Musculoskeletal  [] Skin/Breast  [] Neurological  [] Endocrine  [] Heme/Lymph  [] Allergic/Immunologic    Explanation:     MEDICATIONS:    Current Facility-Administered Medications:     OLANZapine zydis (ZYPREXA) disintegrating tablet 10 mg, 10 mg, Oral, Nightly, ANSLEY Bacon CNP, 10 mg at 11/02/21 2047    prazosin (MINIPRESS) capsule 1 mg, 1 mg, Oral, Nightly, ANSLEY Bacon - CNP, 1 mg at 11/02/21 2047    dextrose 5 % and 0.9 % sodium chloride infusion, , IntraVENous, Continuous, Bret Dash MD    acetaminophen (TYLENOL) tablet 650 mg, 650 mg, Oral, Q6H PRN, ANSLEY Bacon CNP    magnesium hydroxide (MILK OF MAGNESIA) 400 MG/5ML suspension 30 mL, 30 mL, Oral, Daily PRN, ANSLEY Bacon CNP    aluminum & magnesium hydroxide-simethicone (MAALOX) 703-833-61 MG/5ML suspension 30 mL, 30 mL, Oral, PRN, Stevie Roque Sulema Carrero, APRN - CNP    hydrOXYzine (VISTARIL) capsule 50 mg, 50 mg, Oral, TID PRN, Deneise Bolds, APRN - CNP, 50 mg at 11/03/21 0856    haloperidol (HALDOL) tablet 5 mg, 5 mg, Oral, Q6H PRN **OR** haloperidol lactate (HALDOL) injection 5 mg, 5 mg, IntraMUSCular, Q6H PRN, Deneise Bolds, APRN - CNP    traZODone (DESYREL) tablet 50 mg, 50 mg, Oral, Nightly PRN, Deneise Bolds, APRN - CNP, 50 mg at 11/02/21 2047    ziprasidone (GEODON) injection 20 mg, 20 mg, IntraMUSCular, Q12H PRN, Deneise Bolds, APRN - CNP    divalproex (DEPAKOTE) DR tablet 500 mg, 500 mg, Oral, 2 times per day, Deneise Bolds, APRN - CNP, 500 mg at 11/03/21 4721    nicotine polacrilex (NICORETTE) gum 2 mg, 2 mg, Oral, Q2H PRN, An Villareal MD, 2 mg at 11/03/21 0856    influenza quadrivalent split vaccine (FLUZONE;FLUARIX;FLULAVAL;AFLURIA) injection 0.5 mL, 0.5 mL, IntraMUSCular, Prior to discharge, An Villareal MD      Examination:  BP (!) 108/58   Pulse 82   Temp 97.2 °F (36.2 °C) (Temporal)   Resp 16   Ht 5' 11.5\" (1.816 m)   Wt 165 lb 3.2 oz (74.9 kg)   SpO2 98%   BMI 22.72 kg/m²   Gait - steady  Medication side effects(SE): none reported    Mental Status Examination:    Level of consciousness:  within normal limits   Appearance:  fair grooming and fair hygiene  Behavior/Motor:  no abnormalities noted  Attitude toward examiner:  cooperative, attentive and good eye contact  Speech:  normal rate and normal volume   Mood: depressed  Affect:  blunted  Thought processes:  coherent   Thought content:  Preoccupied with discharge. Denies SI, HI and AVH. Fixated on discharge.   Cognition:  oriented to person, place, and time   Concentration intact  Insight fair   Judgement poor     ASSESSMENT:   Patient symptoms are:  [] Well controlled  [x] Improving  [] Worsening  [] No change      Diagnosis:   Principal Problem:    Severe manic bipolar 1 disorder with [facility]  Active Problems:    PTSD (post-traumatic stress disorder)    Cannabis abuse  Resolved Problems:    * No resolved hospital problems. *      LABS:    No results for input(s): WBC, HGB, PLT in the last 72 hours. No results for input(s): NA, K, CL, CO2, BUN, CREATININE, GLUCOSE in the last 72 hours. No results for input(s): BILITOT, ALKPHOS, AST, ALT in the last 72 hours. Lab Results   Component Value Date    LABAMPH NOT DETECTED 10/30/2021    BARBSCNU NOT DETECTED 10/30/2021    LABBENZ POSITIVE 10/30/2021    LABMETH NOT DETECTED 10/30/2021    OPIATESCREENURINE NOT DETECTED 10/30/2021    PHENCYCLIDINESCREENURINE NOT DETECTED 10/30/2021    ETOH <10 10/30/2021     Lab Results   Component Value Date    TSH 1.480 10/30/2021     No results found for: LITHIUM  Lab Results   Component Value Date    VALPROATE 78 11/02/2021         Treatment Plan:  Reviewed current Medications with the patient. Risks, benefits, side effects, drug-to-drug interactions and alternatives to treatment were discussed. Collateral information:   CD evaluation  Encourage patient to attend group and other milieu activities.   Discharge planning discussed with the patient and treatment team.    Depakote 500 mg twice daily for mood stabilization  Zyprexa 10 mg at bedtime for mood and psychosis  Prazosin 1 mg at bedtime for nightmares and flashbacks    PSYCHOTHERAPY/COUNSELING:  [x] Therapeutic interview  [x] Supportive  [] CBT  [] Ongoing  [] Other    [x] Patient continues to need, on a daily basis, active treatment furnished directly by or requiring the supervision of inpatient psychiatric personnel      Anticipated Length of stay: 1-2 more days based on stability            Electronically signed by ANSLEY Loya CNP on 15/8/7972 at 1:51 PM

## 2021-11-03 NOTE — GROUP NOTE
Group Therapy Note    Date: 11/3/2021    Group Start Time: 1115  Group End Time: 8662  Group Topic: Cognitive Skills    [facility]    JESSICA Jimenez LSW        Group Therapy Note    Attendees: 11         Patient's Goal:  Patient will understand and verbalize positive traits    Notes:  Patient attended and participated well    Status After Intervention:  Unchanged    Participation Level:  Active Listener    Participation Quality: Appropriate      Speech:  normal      Thought Process/Content: Logical      Affective Functioning: Congruent      Mood: anxious      Level of consciousness:  Alert      Response to Learning: Able to verbalize current knowledge/experience      Endings: None Reported    Modes of Intervention: Education, Support, Socialization, Exploration, Clarifying and Problem-solving      Discipline Responsible: /Counselor      Signature:  JESSICA Jimenez LSW

## 2021-11-03 NOTE — PROGRESS NOTES
Pt attended afternoon meet/greet and recreation activity of family feud. Updated on evening staff and changes. Enjoyed family feud activity. Pt was 1 out of 11 in attendance.

## 2021-11-03 NOTE — PROGRESS NOTES
[facility]  Discharge Note    Pt discharged with followings belongings:   Dentures: None  Vision - Corrective Lenses: None  Hearing Aid: None  Jewelry: None  Body Piercings Removed: No  Clothing:  (one key fob)  Were All Patient Medications Collected?: Not Applicable  Other Valuables: Cell phone, [address] sent home with patient. Patient education on aftercare instructions.   I.Patient verbalize understanding of AVS.    Status EXAM upon discharge:  Status and Exam  Normal: No  Facial Expression: Avoids Gaze  Affect: Blunt  Level of Consciousness: Alert  Mood:Normal: No  Mood:  (CALM)  Motor Activity:Normal: Yes  Motor Activity: Decreased  Interview Behavior: Cooperative  Preception: [place] to Person, Raford Bale to Time, [place] to Place, [place] to Situation  Attention:Normal: No  Attention: Distractible  Thought Processes: Other(See comment) (IMPOVERISHED)  Thought Content:Normal: No  Thought Content: Poverty of Content  Hallucinations: None  Delusions: No  Delusions: Persecution  Memory:Normal: Yes  Memory: Poor Recent, Poor Remote  Insight and Judgment: No  Insight and Judgment: Other(See comment) (IMPAIRED, IMPROVED)  Present Suicidal Ideation: No  Present Homicidal Ideation: No      Metabolic Screening:    No results found for: LABA1C    No results found for: CHOL  No results found for: TRIG  No results found for: HDL  No components found for: LDLCAL  No results found for: Manisha Trejo RN

## 2021-11-03 NOTE — GROUP NOTE
Group Therapy Note    Date: 11/3/2021    Group Start Time: 1000  Group End Time: 1030  Group Topic: Psychoeducation    [facility]    Alyson Oakley, CTRS        Group Therapy Note    Number of participants: 6  Type of group: Psychoeducation  Mode of intervention: Education, Support, Socialization, Exploration, Clarifying, and Problem-solving  Topic: Self Management Skills  Objective: Pt will identify 3 ways to regulate and control their actions, feelings, and thoughts           Patient's Goal:  \"Keep staying patient\"    Notes:  Pt interactive during group sharing 3 ways to regulate and control actions, feelings, and thoughts. Pt gave support and feedback to others. Status After Intervention:  Improved    Participation Level:  Active Listener and Interactive    Participation Quality: Appropriate, Attentive, Sharing and Supportive      Speech:  normal      Thought Process/Content: Logical      Affective Functioning: Congruent      Mood: euthymic      Level of consciousness:  Alert, Oriented x4 and Attentive      Response to Learning: Able to verbalize current knowledge/experience, Able to verbalize/acknowledge new learning, Able to retain information, Capable of insight, Able to change behavior and Progressing to goal      Endings: None Reported    Modes of Intervention: Education, Support, Socialization, Exploration, Clarifying and Problem-solving

## 2021-11-03 NOTE — PLAN OF CARE
Problem: Altered Mood, Psychotic Behavior:  Goal: Able to verbalize reality based thinking  Description: Able to verbalize reality based thinking  Outcome: Met This Shift  NO VOICED DELUSIONS ON A.M. ASSESSMENT. PT. DENIES PARANOIA. Goal: Absence of self-harm  Description: Absence of self-harm  Outcome: Met This Shift  PT. DENIES SELF HARM THOUGHTS. NO SELF HARM BEHAVIORS REPORTED OR OBSERVED.

## 2021-11-03 NOTE — CARE COORDINATION
Collateral Contact (KYM signed)  Name: Melissa Love  Relationship: sister   Number: [phone]    SW called sister to inform her of pt's DC toady. Pt's other sister will be picking him up. No concerns.